# Patient Record
Sex: FEMALE | Race: WHITE | URBAN - METROPOLITAN AREA
[De-identification: names, ages, dates, MRNs, and addresses within clinical notes are randomized per-mention and may not be internally consistent; named-entity substitution may affect disease eponyms.]

---

## 2021-07-21 ENCOUNTER — OFFICE VISIT (OUTPATIENT)
Dept: INTERNAL MEDICINE CLINIC | Facility: CLINIC | Age: 36
End: 2021-07-21
Payer: COMMERCIAL

## 2021-07-21 VITALS
TEMPERATURE: 99 F | HEART RATE: 93 BPM | HEIGHT: 62 IN | DIASTOLIC BLOOD PRESSURE: 84 MMHG | RESPIRATION RATE: 16 BRPM | BODY MASS INDEX: 38.46 KG/M2 | WEIGHT: 209 LBS | SYSTOLIC BLOOD PRESSURE: 134 MMHG | OXYGEN SATURATION: 98 %

## 2021-07-21 DIAGNOSIS — R05.3 CHRONIC COUGH: ICD-10-CM

## 2021-07-21 DIAGNOSIS — E66.9 OBESITY (BMI 30-39.9): ICD-10-CM

## 2021-07-21 DIAGNOSIS — Z00.00 PHYSICAL EXAM: ICD-10-CM

## 2021-07-21 DIAGNOSIS — R03.0 ELEVATED BLOOD PRESSURE READING: ICD-10-CM

## 2021-07-21 DIAGNOSIS — Z13.220 SCREENING FOR CHOLESTEROL LEVEL: ICD-10-CM

## 2021-07-21 DIAGNOSIS — R63.5 WEIGHT GAIN: Primary | ICD-10-CM

## 2021-07-21 DIAGNOSIS — Z13.29 SCREENING FOR THYROID DISORDER: ICD-10-CM

## 2021-07-21 DIAGNOSIS — Z86.16 HISTORY OF COVID-19: ICD-10-CM

## 2021-07-21 DIAGNOSIS — E55.9 VITAMIN D DEFICIENCY: ICD-10-CM

## 2021-07-21 DIAGNOSIS — Z13.1 SCREENING FOR DIABETES MELLITUS: ICD-10-CM

## 2021-07-21 PROCEDURE — 3075F SYST BP GE 130 - 139MM HG: CPT | Performed by: NURSE PRACTITIONER

## 2021-07-21 PROCEDURE — 99204 OFFICE O/P NEW MOD 45 MIN: CPT | Performed by: NURSE PRACTITIONER

## 2021-07-21 PROCEDURE — 3079F DIAST BP 80-89 MM HG: CPT | Performed by: NURSE PRACTITIONER

## 2021-07-21 PROCEDURE — 3008F BODY MASS INDEX DOCD: CPT | Performed by: NURSE PRACTITIONER

## 2021-07-21 NOTE — PROGRESS NOTES
[facility]    CHIEF COMPLAINT:  Patient presents with:  Weight Problem: Rapid weight gain. 148 5 yrs ago.         HISTORY OF PRESENT ILLNESS:  The patient is a 39year old year old female new to this office who [facility]      Smoking status: Never Smoker      Smokeless tobacco: Never Used    Substance and Sexual Activity      Alcohol use: Not Currently      Drug use: Never      Sexual activity: Not on file    Other Topics      Concerns:        Caffeine Concern:  Yes (sepsis) Sister    • No Known Problems Daughter        REVIEW OF SYSTEMS:  Review of Systems   Constitutional: Positive for unexpected weight change. HENT: Negative. Eyes: Negative. Respiratory: Positive for cough. Cardiovascular: Negative.     G day.  She is eating a healthy diet with lots of protein and vegetables.   She does have a significant family history of diabetes and thyroid disease.  -Continue healthy diet and exercise.  -Labs ordered to evaluate further.  -If no explanation for weight ga

## 2021-07-21 NOTE — PATIENT INSTRUCTIONS
Get your labs done. You should be fasting for at least 10 hours. If you take a multivitamin with Biotin or any biotin product it should be held for 3 days prior to getting your labs done.     Follow up in month for your annual physical   High Blood Pressure end your need for medicines in the future. · Begin a weight-loss program if you are overweight. · Cut back on how much salt you get in your diet. Here’s how to do this:  ? Don’t eat foods that have a lot of salt.  These include olives, pickles, smoked srinivasan an automatic blood pressure machine. Your provider can make a recommendation. You can get one of these at most pharmacies.   The American Heart Association recommends the following guidelines for home blood pressure monitoring:  · Don't smoke or drink coffe about your new medicine or your blood pressure, call your provider. Unless told otherwise, follow up with your healthcare provider or this facility within the next 3 days.   When to seek medical advice  Call your healthcare provider right away if any of the

## 2021-07-24 ENCOUNTER — LAB ENCOUNTER (OUTPATIENT)
Dept: LAB | Age: 36
End: 2021-07-24
Attending: NURSE PRACTITIONER
Payer: COMMERCIAL

## 2021-07-24 DIAGNOSIS — Z13.220 SCREENING FOR CHOLESTEROL LEVEL: ICD-10-CM

## 2021-07-24 DIAGNOSIS — Z13.29 SCREENING FOR THYROID DISORDER: ICD-10-CM

## 2021-07-24 DIAGNOSIS — E55.9 VITAMIN D DEFICIENCY: ICD-10-CM

## 2021-07-24 DIAGNOSIS — R63.5 WEIGHT GAIN: ICD-10-CM

## 2021-07-24 DIAGNOSIS — Z13.1 SCREENING FOR DIABETES MELLITUS: ICD-10-CM

## 2021-07-24 DIAGNOSIS — Z00.00 PHYSICAL EXAM: ICD-10-CM

## 2021-07-24 LAB
ALBUMIN SERPL-MCNC: 3.6 G/DL (ref 3.4–5)
ALBUMIN/GLOB SERPL: 1 {RATIO} (ref 1–2)
ALP LIVER SERPL-CCNC: 98 U/L
ALT SERPL-CCNC: 43 U/L
ANION GAP SERPL CALC-SCNC: 5 MMOL/L (ref 0–18)
AST SERPL-CCNC: 27 U/L (ref 15–37)
BASOPHILS # BLD AUTO: 0.05 X10(3) UL (ref 0–0.2)
BASOPHILS NFR BLD AUTO: 0.5 %
BILIRUB SERPL-MCNC: 0.3 MG/DL (ref 0.1–2)
BUN BLD-MCNC: 15 MG/DL (ref 7–18)
BUN/CREAT SERPL: 14.7 (ref 10–20)
CALCIUM BLD-MCNC: 8.8 MG/DL (ref 8.5–10.1)
CHLORIDE SERPL-SCNC: 108 MMOL/L (ref 98–112)
CHOLEST SMN-MCNC: 230 MG/DL (ref ?–200)
CO2 SERPL-SCNC: 23 MMOL/L (ref 21–32)
CREAT BLD-MCNC: 1.02 MG/DL
DEPRECATED RDW RBC AUTO: 47.3 FL (ref 35.1–46.3)
EOSINOPHIL # BLD AUTO: 0.18 X10(3) UL (ref 0–0.7)
EOSINOPHIL NFR BLD AUTO: 1.8 %
ERYTHROCYTE [DISTWIDTH] IN BLOOD BY AUTOMATED COUNT: 15.2 % (ref 11–15)
EST. AVERAGE GLUCOSE BLD GHB EST-MCNC: 117 MG/DL (ref 68–126)
GLOBULIN PLAS-MCNC: 3.6 G/DL (ref 2.8–4.4)
GLUCOSE BLD-MCNC: 88 MG/DL (ref 70–99)
HBA1C MFR BLD HPLC: 5.7 % (ref ?–5.7)
HCT VFR BLD AUTO: 39.5 %
HDLC SERPL-MCNC: 50 MG/DL (ref 40–59)
HGB BLD-MCNC: 12.4 G/DL
IMM GRANULOCYTES # BLD AUTO: 0.03 X10(3) UL (ref 0–1)
IMM GRANULOCYTES NFR BLD: 0.3 %
LDLC SERPL CALC-MCNC: 167 MG/DL (ref ?–100)
LYMPHOCYTES # BLD AUTO: 2.06 X10(3) UL (ref 1–4)
LYMPHOCYTES NFR BLD AUTO: 20.2 %
M PROTEIN MFR SERPL ELPH: 7.2 G/DL (ref 6.4–8.2)
MCH RBC QN AUTO: 27 PG (ref 26–34)
MCHC RBC AUTO-ENTMCNC: 31.4 G/DL (ref 31–37)
MCV RBC AUTO: 86.1 FL
MONOCYTES # BLD AUTO: 0.48 X10(3) UL (ref 0.1–1)
MONOCYTES NFR BLD AUTO: 4.7 %
NEUTROPHILS # BLD AUTO: 7.38 X10 (3) UL (ref 1.5–7.7)
NEUTROPHILS # BLD AUTO: 7.38 X10(3) UL (ref 1.5–7.7)
NEUTROPHILS NFR BLD AUTO: 72.5 %
NONHDLC SERPL-MCNC: 180 MG/DL (ref ?–130)
OSMOLALITY SERPL CALC.SUM OF ELEC: 282 MOSM/KG (ref 275–295)
PATIENT FASTING Y/N/NP: YES
PATIENT FASTING Y/N/NP: YES
PLATELET # BLD AUTO: 356 10(3)UL (ref 150–450)
POTASSIUM SERPL-SCNC: 4.4 MMOL/L (ref 3.5–5.1)
RBC # BLD AUTO: 4.59 X10(6)UL
SODIUM SERPL-SCNC: 136 MMOL/L (ref 136–145)
TRIGL SERPL-MCNC: 74 MG/DL (ref 30–149)
TSI SER-ACNC: 1.61 MIU/ML (ref 0.36–3.74)
VIT B12 SERPL-MCNC: 451 PG/ML (ref 193–986)
VIT D+METAB SERPL-MCNC: 27 NG/ML (ref 30–100)
VLDLC SERPL CALC-MCNC: 15 MG/DL (ref 0–30)
WBC # BLD AUTO: 10.2 X10(3) UL (ref 4–11)

## 2021-07-24 PROCEDURE — 82306 VITAMIN D 25 HYDROXY: CPT | Performed by: NURSE PRACTITIONER

## 2021-07-24 PROCEDURE — 82607 VITAMIN B-12: CPT | Performed by: NURSE PRACTITIONER

## 2021-07-24 PROCEDURE — 80050 GENERAL HEALTH PANEL: CPT | Performed by: NURSE PRACTITIONER

## 2021-07-24 PROCEDURE — 80061 LIPID PANEL: CPT | Performed by: NURSE PRACTITIONER

## 2021-07-24 PROCEDURE — 83036 HEMOGLOBIN GLYCOSYLATED A1C: CPT | Performed by: NURSE PRACTITIONER

## 2021-08-13 ENCOUNTER — OFFICE VISIT (OUTPATIENT)
Dept: INTERNAL MEDICINE CLINIC | Facility: CLINIC | Age: 36
End: 2021-08-13
Payer: COMMERCIAL

## 2021-08-13 VITALS
WEIGHT: 206.81 LBS | OXYGEN SATURATION: 98 % | HEART RATE: 86 BPM | RESPIRATION RATE: 18 BRPM | TEMPERATURE: 98 F | BODY MASS INDEX: 38.06 KG/M2 | SYSTOLIC BLOOD PRESSURE: 136 MMHG | DIASTOLIC BLOOD PRESSURE: 84 MMHG | HEIGHT: 62 IN

## 2021-08-13 DIAGNOSIS — R03.0 ELEVATED BLOOD PRESSURE READING: ICD-10-CM

## 2021-08-13 DIAGNOSIS — E78.5 HYPERLIPIDEMIA, UNSPECIFIED HYPERLIPIDEMIA TYPE: ICD-10-CM

## 2021-08-13 DIAGNOSIS — E66.09 CLASS 2 OBESITY DUE TO EXCESS CALORIES WITHOUT SERIOUS COMORBIDITY WITH BODY MASS INDEX (BMI) OF 37.0 TO 37.9 IN ADULT: ICD-10-CM

## 2021-08-13 DIAGNOSIS — R73.03 PREDIABETES: ICD-10-CM

## 2021-08-13 DIAGNOSIS — Z00.00 ENCOUNTER FOR ANNUAL PHYSICAL EXAM: Primary | ICD-10-CM

## 2021-08-13 PROCEDURE — 99395 PREV VISIT EST AGE 18-39: CPT | Performed by: NURSE PRACTITIONER

## 2021-08-13 PROCEDURE — 3075F SYST BP GE 130 - 139MM HG: CPT | Performed by: NURSE PRACTITIONER

## 2021-08-13 PROCEDURE — 3008F BODY MASS INDEX DOCD: CPT | Performed by: NURSE PRACTITIONER

## 2021-08-13 PROCEDURE — 99214 OFFICE O/P EST MOD 30 MIN: CPT | Performed by: NURSE PRACTITIONER

## 2021-08-13 PROCEDURE — 3079F DIAST BP 80-89 MM HG: CPT | Performed by: NURSE PRACTITIONER

## 2021-08-13 RX ORDER — METFORMIN HYDROCHLORIDE 750 MG/1
750 TABLET, EXTENDED RELEASE ORAL DAILY
Qty: 90 TABLET | Refills: 0 | Status: SHIPPED | OUTPATIENT
Start: 2021-08-13 | End: 2021-11-12 | Stop reason: ALTCHOICE

## 2021-08-13 NOTE — PATIENT INSTRUCTIONS
Start metformin. Monitor for side effects and effectiveness. Continue exercising    Improve your diet    Follow up in 3 months for a med check.

## 2021-08-13 NOTE — PROGRESS NOTES
CHIEF COMPLAINT   Complete physical, weight loss    HPI:   Zhen Bryant is a 39year old female who presents for a complete physical exam.     Wt Readings from Last 6 Encounters:  08/13/21 : 206 lb 12.8 oz (93.8 kg)  07/21/21 : 209 lb (94.8 kg)    Body Other (COPD) Paternal Grandmother    • Other (Alzheimer's Disease) Paternal Grandfather    • Other (sepsis) Sister    • No Known Problems Daughter       Social History:   Social History    Tobacco Use      Smoking status: Never Smoker      Smokeless tobacc Date    WBC 10.2 07/24/2021    RBC 4.59 07/24/2021    HGB 12.4 07/24/2021    HCT 39.5 07/24/2021    MCV 86.1 07/24/2021    MCH 27.0 07/24/2021    MCHC 31.4 07/24/2021    RDW 15.2 (H) 07/24/2021    .0 07/24/2021      Lab Results   Component Value Ranulfo effects and benefits discussed and patient is agreeable to start that at this time  -Follow-up in 3 months to monitor weight and med check  - metFORMIN HCl  MG Oral Tablet 24 Hr; Take 1 tablet (750 mg total) by mouth daily. Dispense: 90 tablet;  Refi

## 2021-11-12 ENCOUNTER — OFFICE VISIT (OUTPATIENT)
Dept: INTERNAL MEDICINE CLINIC | Facility: CLINIC | Age: 36
End: 2021-11-12
Payer: COMMERCIAL

## 2021-11-12 VITALS
SYSTOLIC BLOOD PRESSURE: 118 MMHG | WEIGHT: 209.38 LBS | HEIGHT: 62 IN | RESPIRATION RATE: 14 BRPM | DIASTOLIC BLOOD PRESSURE: 76 MMHG | HEART RATE: 104 BPM | OXYGEN SATURATION: 97 % | TEMPERATURE: 98 F | BODY MASS INDEX: 38.53 KG/M2

## 2021-11-12 DIAGNOSIS — E66.9 OBESITY (BMI 30-39.9): Primary | ICD-10-CM

## 2021-11-12 PROCEDURE — 99213 OFFICE O/P EST LOW 20 MIN: CPT | Performed by: NURSE PRACTITIONER

## 2021-11-12 PROCEDURE — 3074F SYST BP LT 130 MM HG: CPT | Performed by: NURSE PRACTITIONER

## 2021-11-12 PROCEDURE — 3008F BODY MASS INDEX DOCD: CPT | Performed by: NURSE PRACTITIONER

## 2021-11-12 PROCEDURE — 3078F DIAST BP <80 MM HG: CPT | Performed by: NURSE PRACTITIONER

## 2021-11-12 NOTE — PROGRESS NOTES
Cole Schaffer is a 39year old female. CHIEF COMPLAINT   Obesity fu    HPI:   Metformin was not helpful. Took it for 6 weeks or so. Ran out. No SE though    Not tracking diet. Is exercising great.  Has not gained weight but has not lost.     No current 07/24/2021     07/24/2021    CO2 23.0 07/24/2021      Lab Results   Component Value Date    CHOLEST 230 (H) 07/24/2021    TRIG 74 07/24/2021    HDL 50 07/24/2021     (H) 07/24/2021    VLDL 15 07/24/2021    NONHDLC 180 (H) 07/24/2021      Lab R

## 2021-11-12 NOTE — PATIENT INSTRUCTIONS
Use my fitness pal karla daily. Lower carbs and increase protein.     Continue exercising    Follow up in 3 months or sooner as needed

## 2022-01-21 ENCOUNTER — IMMUNIZATION (OUTPATIENT)
Dept: LAB | Facility: HOSPITAL | Age: 37
End: 2022-01-21
Attending: EMERGENCY MEDICINE
Payer: COMMERCIAL

## 2022-01-21 DIAGNOSIS — Z23 NEED FOR VACCINATION: Primary | ICD-10-CM

## 2022-01-21 PROCEDURE — 0064A SARSCOV2 VAC 50MCG/0.25ML IM: CPT

## 2022-02-11 ENCOUNTER — OFFICE VISIT (OUTPATIENT)
Dept: INTERNAL MEDICINE CLINIC | Facility: CLINIC | Age: 37
End: 2022-02-11
Payer: COMMERCIAL

## 2022-02-11 VITALS
HEIGHT: 62 IN | TEMPERATURE: 98 F | WEIGHT: 215.38 LBS | DIASTOLIC BLOOD PRESSURE: 76 MMHG | OXYGEN SATURATION: 98 % | BODY MASS INDEX: 39.63 KG/M2 | HEART RATE: 115 BPM | RESPIRATION RATE: 16 BRPM | SYSTOLIC BLOOD PRESSURE: 122 MMHG

## 2022-02-11 DIAGNOSIS — R63.5 WEIGHT GAIN: ICD-10-CM

## 2022-02-11 DIAGNOSIS — E66.9 OBESITY (BMI 30-39.9): Primary | ICD-10-CM

## 2022-02-11 DIAGNOSIS — Z23 NEED FOR VACCINATION: ICD-10-CM

## 2022-02-11 DIAGNOSIS — L98.9 SKIN LESION: ICD-10-CM

## 2022-02-11 DIAGNOSIS — R53.83 FATIGUE, UNSPECIFIED TYPE: ICD-10-CM

## 2022-02-11 PROCEDURE — 99214 OFFICE O/P EST MOD 30 MIN: CPT | Performed by: NURSE PRACTITIONER

## 2022-02-11 PROCEDURE — 3008F BODY MASS INDEX DOCD: CPT | Performed by: NURSE PRACTITIONER

## 2022-02-11 PROCEDURE — 3074F SYST BP LT 130 MM HG: CPT | Performed by: NURSE PRACTITIONER

## 2022-02-11 PROCEDURE — 3078F DIAST BP <80 MM HG: CPT | Performed by: NURSE PRACTITIONER

## 2022-02-11 NOTE — PATIENT INSTRUCTIONS
See endocrinology for further evaluation    Go to weight loss clinic if needed     Get your labs done once your see endo    Follow up as needed or when your annual visit is due

## 2022-03-10 ENCOUNTER — OFFICE VISIT (OUTPATIENT)
Dept: ENDOCRINOLOGY CLINIC | Facility: CLINIC | Age: 37
End: 2022-03-10
Payer: COMMERCIAL

## 2022-03-10 VITALS
RESPIRATION RATE: 18 BRPM | WEIGHT: 211 LBS | HEART RATE: 99 BPM | BODY MASS INDEX: 38.83 KG/M2 | HEIGHT: 62 IN | SYSTOLIC BLOOD PRESSURE: 138 MMHG | DIASTOLIC BLOOD PRESSURE: 96 MMHG

## 2022-03-10 DIAGNOSIS — E66.01 CLASS 2 SEVERE OBESITY DUE TO EXCESS CALORIES WITH SERIOUS COMORBIDITY AND BODY MASS INDEX (BMI) OF 38.0 TO 38.9 IN ADULT (HCC): ICD-10-CM

## 2022-03-10 DIAGNOSIS — E04.9 GOITER: Primary | ICD-10-CM

## 2022-03-10 PROBLEM — E66.812 CLASS 2 SEVERE OBESITY DUE TO EXCESS CALORIES WITH SERIOUS COMORBIDITY AND BODY MASS INDEX (BMI) OF 38.0 TO 38.9 IN ADULT (HCC): Status: ACTIVE | Noted: 2022-03-10

## 2022-03-10 PROCEDURE — 3008F BODY MASS INDEX DOCD: CPT | Performed by: INTERNAL MEDICINE

## 2022-03-10 PROCEDURE — 3075F SYST BP GE 130 - 139MM HG: CPT | Performed by: INTERNAL MEDICINE

## 2022-03-10 PROCEDURE — 99244 OFF/OP CNSLTJ NEW/EST MOD 40: CPT | Performed by: INTERNAL MEDICINE

## 2022-03-10 PROCEDURE — 3080F DIAST BP >= 90 MM HG: CPT | Performed by: INTERNAL MEDICINE

## 2022-03-15 ENCOUNTER — PATIENT MESSAGE (OUTPATIENT)
Dept: INTERNAL MEDICINE CLINIC | Facility: CLINIC | Age: 37
End: 2022-03-15

## 2022-03-15 ENCOUNTER — LAB ENCOUNTER (OUTPATIENT)
Dept: LAB | Age: 37
End: 2022-03-15
Attending: INTERNAL MEDICINE
Payer: COMMERCIAL

## 2022-03-15 DIAGNOSIS — L98.9 SKIN LESION: ICD-10-CM

## 2022-03-15 DIAGNOSIS — E66.01 CLASS 2 SEVERE OBESITY DUE TO EXCESS CALORIES WITH SERIOUS COMORBIDITY AND BODY MASS INDEX (BMI) OF 38.0 TO 38.9 IN ADULT (HCC): ICD-10-CM

## 2022-03-15 DIAGNOSIS — R53.83 FATIGUE, UNSPECIFIED TYPE: ICD-10-CM

## 2022-03-15 LAB
ALBUMIN SERPL-MCNC: 3.5 G/DL (ref 3.4–5)
ALBUMIN/GLOB SERPL: 1.2 {RATIO} (ref 1–2)
ALP LIVER SERPL-CCNC: 90 U/L
ALT SERPL-CCNC: 35 U/L
ANION GAP SERPL CALC-SCNC: 6 MMOL/L (ref 0–18)
AST SERPL-CCNC: 27 U/L (ref 15–37)
BILIRUB SERPL-MCNC: 0.3 MG/DL (ref 0.1–2)
BUN BLD-MCNC: 12 MG/DL (ref 7–18)
CALCIUM BLD-MCNC: 9.1 MG/DL (ref 8.5–10.1)
CHLORIDE SERPL-SCNC: 108 MMOL/L (ref 98–112)
CHOLEST SERPL-MCNC: 200 MG/DL (ref ?–200)
CO2 SERPL-SCNC: 24 MMOL/L (ref 21–32)
CORTIS SERPL-MCNC: 20.2 UG/DL
CREAT BLD-MCNC: 0.95 MG/DL
CRP SERPL-MCNC: 1.59 MG/DL (ref ?–0.3)
ERYTHROCYTE [SEDIMENTATION RATE] IN BLOOD: 24 MM/HR
EST. AVERAGE GLUCOSE BLD GHB EST-MCNC: 114 MG/DL (ref 68–126)
FASTING PATIENT LIPID ANSWER: YES
FASTING STATUS PATIENT QL REPORTED: YES
GLOBULIN PLAS-MCNC: 3 G/DL (ref 2.8–4.4)
GLUCOSE BLD-MCNC: 86 MG/DL (ref 70–99)
HBA1C MFR BLD: 5.6 % (ref ?–5.7)
HDLC SERPL-MCNC: 53 MG/DL (ref 40–59)
LDLC SERPL CALC-MCNC: 121 MG/DL (ref ?–100)
NONHDLC SERPL-MCNC: 147 MG/DL (ref ?–130)
OSMOLALITY SERPL CALC.SUM OF ELEC: 285 MOSM/KG (ref 275–295)
POTASSIUM SERPL-SCNC: 4.3 MMOL/L (ref 3.5–5.1)
PROT SERPL-MCNC: 6.5 G/DL (ref 6.4–8.2)
SODIUM SERPL-SCNC: 138 MMOL/L (ref 136–145)
T4 FREE SERPL-MCNC: 1 NG/DL (ref 0.8–1.7)
TRIGL SERPL-MCNC: 145 MG/DL (ref 30–149)
TSI SER-ACNC: 3.09 MIU/ML (ref 0.36–3.74)
VLDLC SERPL CALC-MCNC: 26 MG/DL (ref 0–30)

## 2022-03-15 PROCEDURE — 85652 RBC SED RATE AUTOMATED: CPT | Performed by: NURSE PRACTITIONER

## 2022-03-15 PROCEDURE — 86140 C-REACTIVE PROTEIN: CPT | Performed by: NURSE PRACTITIONER

## 2022-03-15 PROCEDURE — 86038 ANTINUCLEAR ANTIBODIES: CPT | Performed by: NURSE PRACTITIONER

## 2022-03-15 PROCEDURE — 86200 CCP ANTIBODY: CPT | Performed by: NURSE PRACTITIONER

## 2022-03-15 NOTE — TELEPHONE ENCOUNTER
The point of going to them was to make sure the was no hormonal cause to the weight gain before going to weight loss clinic. CRP is a non specific inflammatory marker and can be elevated for acute thyroiditis since it is inflammation but not for other benign thyroid enlargement. Lets wait for the US of the thyroid and the other lab results to come back before we know more. Thanks.

## 2022-03-16 LAB
ADRENOCORTICOTROPIC HORMONE: 15.7 PG/ML
ANA SER QL: NEGATIVE

## 2022-03-18 LAB — CCP IGG SERPL-ACNC: 2 U/ML (ref 0–6.9)

## 2022-03-21 ENCOUNTER — TELEPHONE (OUTPATIENT)
Dept: ENDOCRINOLOGY CLINIC | Facility: CLINIC | Age: 37
End: 2022-03-21

## 2022-03-21 ENCOUNTER — HOSPITAL ENCOUNTER (OUTPATIENT)
Dept: ULTRASOUND IMAGING | Age: 37
Discharge: HOME | End: 2022-03-21
Attending: INTERNAL MEDICINE
Payer: COMMERCIAL

## 2022-03-21 DIAGNOSIS — E04.9 GOITER: ICD-10-CM

## 2022-03-21 PROCEDURE — 76536 US EXAM OF HEAD AND NECK: CPT | Performed by: INTERNAL MEDICINE

## 2022-03-22 ENCOUNTER — OFFICE VISIT (OUTPATIENT)
Dept: INTERNAL MEDICINE CLINIC | Facility: CLINIC | Age: 37
End: 2022-03-22
Payer: COMMERCIAL

## 2022-03-22 VITALS
BODY MASS INDEX: 38.57 KG/M2 | HEART RATE: 91 BPM | SYSTOLIC BLOOD PRESSURE: 130 MMHG | OXYGEN SATURATION: 96 % | HEIGHT: 62.5 IN | DIASTOLIC BLOOD PRESSURE: 70 MMHG | WEIGHT: 215 LBS | RESPIRATION RATE: 17 BRPM

## 2022-03-22 DIAGNOSIS — R73.03 PREDIABETES: ICD-10-CM

## 2022-03-22 DIAGNOSIS — E55.9 VITAMIN D DEFICIENCY: ICD-10-CM

## 2022-03-22 DIAGNOSIS — E78.5 HYPERLIPIDEMIA, UNSPECIFIED HYPERLIPIDEMIA TYPE: ICD-10-CM

## 2022-03-22 DIAGNOSIS — R63.2 BINGE EATING: ICD-10-CM

## 2022-03-22 DIAGNOSIS — E66.9 OBESITY (BMI 30-39.9): ICD-10-CM

## 2022-03-22 DIAGNOSIS — Z51.81 THERAPEUTIC DRUG MONITORING: Primary | ICD-10-CM

## 2022-03-22 PROCEDURE — 3078F DIAST BP <80 MM HG: CPT | Performed by: NURSE PRACTITIONER

## 2022-03-22 PROCEDURE — 93000 ELECTROCARDIOGRAM COMPLETE: CPT | Performed by: NURSE PRACTITIONER

## 2022-03-22 PROCEDURE — 99214 OFFICE O/P EST MOD 30 MIN: CPT | Performed by: NURSE PRACTITIONER

## 2022-03-22 PROCEDURE — 3008F BODY MASS INDEX DOCD: CPT | Performed by: NURSE PRACTITIONER

## 2022-03-22 PROCEDURE — 3075F SYST BP GE 130 - 139MM HG: CPT | Performed by: NURSE PRACTITIONER

## 2022-03-22 NOTE — PATIENT INSTRUCTIONS
We are here to support you with weight loss, but please remember that you still need your primary care provider for your routine health maintenance. PLAN:  Will start vyvanse 20mg daily  Will check with insurance on coverage for GLP-1 medication (ie. saxenda or wegovy)   Follow up with me in 4-5 weeks  Schedule follow up appointments: Yuni Shipman (dietitian) or Wen Fairchild (presurgery dietitian)   Check for insurance coverage for dietitian and labwork prior to scheduling appointment. Please try to work on the following dietary changes:  1. Goals: Aim for 20-30 grams of protein/ meal  i. Aim for 110 grams of carbohydrates/day  ii. Eat 4-6 vegetables/day  iii. Avoid skipping meals- eat every 4-5 hours  iv. Aim for 3 meals/day  2. Drink lots of water and cut down on soda/juice consumption if soda/juice drinker  3. Focus on protein: (15-30 grams with each meal) ie. greek yogurt, cottage cheese, string cheese, hard boiled eggs  4. Healthy snacks: peanut butter and apples, hummus and carrots, berries, nuts (1/4 cup), tuna and crackers                 Protein Shakes: Premier protein or Core Power                Protein Bars: Rx Bars, Oatmega, Power Crunch                 Sargento balanced breaks (cheese and nuts)- without chocolate  5. Reduce carbohydrates which includes sweets as well as rice, pasta, potatoes, bread, corn and instead choose whole grain options or more protein or vegetables (4-6 servings of vegetables per day)  6. Get a good night of sleep  7. Try to decrease stress in life     Please download apps:  1. \"My Fitness Pal\" (other option is Lose it)) to help you to monitor daily dietary intake and you will be able to see if you are eating the right amount of calories, protein, carbs                With My Fitness Pal-->When you set-up the karla or need to adjust settings:                Goals should include:                  Lose 1.5-2 lbs per week                Activity level: not very active (can't count exercise towards calorie number per day)                   ** Daily INPUT> Look at nutrition section-- \"nutrients\" and it will break down your macros for the day (ie. Protein, carbs, fibers, sugars and fats). Try to stay within these numbers daily     2. \"7 minute workout\" to help with exercise/activity which takes 7 minutes of your day and that you can do at home! 3. \"Calm\" or \"Headspace\" which helps with mindfulness, meditation, clarity, sleep, and laura to your daily life. 4. [employer] blog for healthy recipe ideas  5. [employer] for low carb resources    HIGH PROTEIN SNACK IDEAS  -cottage cheese  -plain yogurt  -kefir  -hard-boiled eggs  -natural cheeses  -nuts (measure portion size)   -unsweetened nut butters  -dried edamame   -linda seeds soaked in water or almond milk  -soy nuts  -cured meats (monitor for sodium issues)   -hummus with vegetables  -bean dip with vegetables     FRUIT  Low carb fruit options   Raspberries: Half a cup (60 grams) contains 3 grams of carbs. Blackberries: Half a cup (70 grams) contains 4 grams of carbs. Strawberries: Half a cup (100 grams) contains 6 grams of carbs. Blueberries: Half a cup (50 grams) contains 6 grams of carbs. Plum: One medium-sized (80 grams) contains 6 grams of carbs.      VEGETABLES  Low carb vegetables

## 2022-03-24 PROBLEM — R63.2 BINGE EATING: Status: ACTIVE | Noted: 2022-03-24

## 2022-03-24 PROBLEM — R73.03 PREDIABETES: Status: ACTIVE | Noted: 2022-03-24

## 2022-03-24 PROBLEM — Z51.81 THERAPEUTIC DRUG MONITORING: Status: ACTIVE | Noted: 2022-03-24

## 2022-03-24 PROBLEM — E55.9 VITAMIN D DEFICIENCY: Status: ACTIVE | Noted: 2022-03-24

## 2022-03-24 PROBLEM — R03.0 ELEVATED BLOOD PRESSURE READING: Status: ACTIVE | Noted: 2022-03-24

## 2022-03-24 PROBLEM — E78.5 HYPERLIPIDEMIA: Status: ACTIVE | Noted: 2022-03-24

## 2022-03-25 ENCOUNTER — TELEPHONE (OUTPATIENT)
Dept: ENDOCRINOLOGY CLINIC | Facility: CLINIC | Age: 37
End: 2022-03-25

## 2022-03-25 NOTE — TELEPHONE ENCOUNTER
Hi!  Can we please call this patient and let her know that I have reviewed her blood tests and that I do not see any abnormalities in her blood tests or her thyroid US? I do think that it would be very beneficial for her to consult with bariatrics, Dr. Maximo Mary or Dr. Chelsie Sauceda for aid in weight loss. I cannot find any endocrine cause for her weight issues. Thank you.

## 2022-04-25 ENCOUNTER — OFFICE VISIT (OUTPATIENT)
Dept: INTERNAL MEDICINE CLINIC | Facility: CLINIC | Age: 37
End: 2022-04-25
Payer: COMMERCIAL

## 2022-04-25 VITALS
SYSTOLIC BLOOD PRESSURE: 128 MMHG | RESPIRATION RATE: 16 BRPM | DIASTOLIC BLOOD PRESSURE: 76 MMHG | WEIGHT: 210 LBS | BODY MASS INDEX: 37.68 KG/M2 | HEIGHT: 62.5 IN | HEART RATE: 80 BPM

## 2022-04-25 DIAGNOSIS — E78.5 HYPERLIPIDEMIA, UNSPECIFIED HYPERLIPIDEMIA TYPE: ICD-10-CM

## 2022-04-25 DIAGNOSIS — E66.9 OBESITY (BMI 30-39.9): ICD-10-CM

## 2022-04-25 DIAGNOSIS — Z51.81 THERAPEUTIC DRUG MONITORING: Primary | ICD-10-CM

## 2022-04-25 DIAGNOSIS — E55.9 VITAMIN D DEFICIENCY: ICD-10-CM

## 2022-04-25 DIAGNOSIS — R63.2 BINGE EATING: ICD-10-CM

## 2022-04-25 DIAGNOSIS — R73.03 PREDIABETES: ICD-10-CM

## 2022-04-25 PROCEDURE — 3078F DIAST BP <80 MM HG: CPT | Performed by: NURSE PRACTITIONER

## 2022-04-25 PROCEDURE — 3008F BODY MASS INDEX DOCD: CPT | Performed by: NURSE PRACTITIONER

## 2022-04-25 PROCEDURE — 99214 OFFICE O/P EST MOD 30 MIN: CPT | Performed by: NURSE PRACTITIONER

## 2022-04-25 PROCEDURE — 3074F SYST BP LT 130 MM HG: CPT | Performed by: NURSE PRACTITIONER

## 2022-04-25 RX ORDER — PHENTERMINE HYDROCHLORIDE 37.5 MG/1
37.5 TABLET ORAL
Qty: 30 TABLET | Refills: 1 | Status: SHIPPED | OUTPATIENT
Start: 2022-04-25

## 2022-04-25 RX ORDER — METFORMIN HYDROCHLORIDE 500 MG/1
500 TABLET, EXTENDED RELEASE ORAL 2 TIMES DAILY WITH MEALS
Qty: 60 TABLET | Refills: 1 | Status: SHIPPED | OUTPATIENT
Start: 2022-04-25

## 2022-04-25 NOTE — PATIENT INSTRUCTIONS
We are here to support you with weight loss, but please remember that you still need your primary care provider for your routine health maintenance. PLAN:  Will stop vyvanse  Will trial phentermine (cut tablet in 1/2 for 2-4 days and then take the full dose)  Will add back metformin 500mg (1 tablet with breakfast and 1 tablet with dinner)   Follow up with me in 4 weeks  Schedule follow up appointments: Jovita Bush (dietitian) or Hamilton Spangler (presurgery dietitian)   Check for insurance coverage for dietitian and labwork prior to scheduling appointment. Please try to work on the following dietary changes:  1. Goals: Aim for 20-30 grams of protein/ meal  i. Aim for 110 grams of carbohydrates/day  ii. Eat 4-6 vegetables/day  iii. Avoid skipping meals- eat every 4-5 hours  iv. Aim for 3 meals/day  2. Drink lots of water and cut down on soda/juice consumption if soda/juice drinker  3. Focus on protein: (15-30 grams with each meal) ie. greek yogurt, cottage cheese, string cheese, hard boiled eggs  4. Healthy snacks: peanut butter and apples, hummus and carrots, berries, nuts (1/4 cup), tuna and crackers                 Protein Shakes: Premier protein or Core Power                Protein Bars: Rx Bars, Oatmega, Power Crunch                 S[place]ento balanced breaks (cheese and nuts)- without chocolate  5. Reduce carbohydrates which includes sweets as well as rice, pasta, potatoes, bread, corn and instead choose whole grain options or more protein or vegetables (4-6 servings of vegetables per day)  6. Get a good night of sleep  7. Try to decrease stress in life     Please download apps:  1. \"My Fitness Pal\" (other option is Lose it)) to help you to monitor daily dietary intake and you will be able to see if you are eating the right amount of calories, protein, carbs                With My Fitness Pal-->When you set-up the karla or need to adjust settings:                Goals should include:                  Lose 1.5-2 lbs per week                Activity level: not very active (can't count exercise towards calorie number per day)                   ** Daily INPUT> Look at nutrition section-- \"nutrients\" and it will break down your macros for the day (ie. Protein, carbs, fibers, sugars and fats). Try to stay within these numbers daily     2. \"7 minute workout\" to help with exercise/activity which takes 7 minutes of your day and that you can do at home! 3. \"Calm\" or \"Headspace\" which helps with mindfulness, meditation, clarity, sleep, and laura to your daily life. 4. [employer] blog for healthy recipe ideas  5. [employer] for low carb resources    HIGH PROTEIN SNACK IDEAS  -cottage cheese  -plain yogurt  -kefir  -hard-boiled eggs  -natural cheeses  -nuts (measure portion size)   -unsweetened nut butters  -dried edamame   -linda seeds soaked in water or almond milk  -soy nuts  -cured meats (monitor for sodium issues)   -hummus with vegetables  -bean dip with vegetables     FRUIT  Low carb fruit options   Raspberries: Half a cup (60 grams) contains 3 grams of carbs. Blackberries: Half a cup (70 grams) contains 4 grams of carbs. Strawberries: Half a cup (100 grams) contains 6 grams of carbs. Blueberries: Half a cup (50 grams) contains 6 grams of carbs. Plum: One medium-sized (80 grams) contains 6 grams of carbs.      VEGETABLES  Low carb vegetables

## 2022-05-31 ENCOUNTER — OFFICE VISIT (OUTPATIENT)
Dept: INTERNAL MEDICINE CLINIC | Facility: CLINIC | Age: 37
End: 2022-05-31
Payer: COMMERCIAL

## 2022-05-31 VITALS
OXYGEN SATURATION: 99 % | DIASTOLIC BLOOD PRESSURE: 72 MMHG | SYSTOLIC BLOOD PRESSURE: 134 MMHG | RESPIRATION RATE: 16 BRPM | WEIGHT: 203 LBS | BODY MASS INDEX: 36.42 KG/M2 | HEART RATE: 92 BPM | HEIGHT: 62.5 IN

## 2022-05-31 DIAGNOSIS — Z51.81 THERAPEUTIC DRUG MONITORING: Primary | ICD-10-CM

## 2022-05-31 DIAGNOSIS — R73.03 PREDIABETES: ICD-10-CM

## 2022-05-31 DIAGNOSIS — E55.9 VITAMIN D DEFICIENCY: ICD-10-CM

## 2022-05-31 DIAGNOSIS — E66.9 OBESITY (BMI 30-39.9): ICD-10-CM

## 2022-05-31 DIAGNOSIS — E78.5 HYPERLIPIDEMIA, UNSPECIFIED HYPERLIPIDEMIA TYPE: ICD-10-CM

## 2022-05-31 PROCEDURE — 3078F DIAST BP <80 MM HG: CPT | Performed by: NURSE PRACTITIONER

## 2022-05-31 PROCEDURE — 99214 OFFICE O/P EST MOD 30 MIN: CPT | Performed by: NURSE PRACTITIONER

## 2022-05-31 PROCEDURE — 3008F BODY MASS INDEX DOCD: CPT | Performed by: NURSE PRACTITIONER

## 2022-05-31 PROCEDURE — 3075F SYST BP GE 130 - 139MM HG: CPT | Performed by: NURSE PRACTITIONER

## 2022-05-31 RX ORDER — PHENTERMINE HYDROCHLORIDE 37.5 MG/1
37.5 TABLET ORAL
Qty: 30 TABLET | Refills: 1 | Status: CANCELLED | OUTPATIENT
Start: 2022-05-31

## 2022-05-31 NOTE — PATIENT INSTRUCTIONS
We are here to support you with weight loss, but please remember that you still need your primary care provider for your routine health maintenance. PLAN:  Will continue with phentermine and metformin   Follow up with me in 8 weeks  Schedule follow up appointments: Nicola Mitchell (dietitian) or Giovani Ramirez (presurgery dietitian)   Check for insurance coverage for dietitian and labwork prior to scheduling appointment. Please try to work on the following dietary changes:  1. Goals: Aim for 20-30 grams of protein/ meal  i. Aim for 100 grams of carbohydrates/day  ii. Eat 4-6 vegetables/day  iii. Avoid skipping meals- eat every 4-5 hours  iv. Aim for 3 meals/day  2. Drink lots of water and cut down on soda/juice consumption if soda/juice drinker  3. Focus on protein: (15-30 grams with each meal) ie. greek yogurt, cottage cheese, string cheese, hard boiled eggs  4. Healthy snacks: peanut butter and apples, hummus and carrots, berries, nuts (1/4 cup), tuna and crackers                 Protein Shakes: Premier protein or Core Power                Protein Bars: Rx Bars, Oatmega, Power Crunch                 S[place]ento balanced breaks (cheese and nuts)- without chocolate  5. Reduce carbohydrates which includes sweets as well as rice, pasta, potatoes, bread, corn and instead choose whole grain options or more protein or vegetables (4-6 servings of vegetables per day)  6. Get a good night of sleep  7. Try to decrease stress in life     Please download apps:  1. \"My Fitness Pal\" (other option is Lose it)) to help you to monitor daily dietary intake and you will be able to see if you are eating the right amount of calories, protein, carbs                With My Fitness Pal-->When you set-up the karla or need to adjust settings:                Goals should include:                  Lose 1.5-2 lbs per week                Activity level: not very active (can't count exercise towards calorie number per day)                   ** Daily INPUT> Look at nutrition section-- \"nutrients\" and it will break down your macros for the day (ie. Protein, carbs, fibers, sugars and fats). Try to stay within these numbers daily     2. \"7 minute workout\" to help with exercise/activity which takes 7 minutes of your day and that you can do at home! 3. \"Calm\" or \"Headspace\" which helps with mindfulness, meditation, clarity, sleep, and laura to your daily life. 4. [employer] blog for healthy recipe ideas  5. [employer] for low carb resources    HIGH PROTEIN SNACK IDEAS  -cottage cheese  -plain yogurt  -kefir  -hard-boiled eggs  -natural cheeses  -nuts (measure portion size)   -unsweetened nut butters  -dried edamame   -linda seeds soaked in water or almond milk  -soy nuts  -cured meats (monitor for sodium issues)   -hummus with vegetables  -bean dip with vegetables     FRUIT  Low carb fruit options   Raspberries: Half a cup (60 grams) contains 3 grams of carbs. Blackberries: Half a cup (70 grams) contains 4 grams of carbs. Strawberries: Half a cup (100 grams) contains 6 grams of carbs. Blueberries: Half a cup (50 grams) contains 6 grams of carbs. Plum: One medium-sized (80 grams) contains 6 grams of carbs.      VEGETABLES  Low carb vegetables

## 2022-07-11 NOTE — TELEPHONE ENCOUNTER
Requesting Phentermine  LOV: 5/31/22  RTC: 8 weeks  Last Relevant Labs: na  Filled: 4/25/22 #30 with 1 refills last [place] 5[place]31/22 [place]30 [place]    Future Appointments   Date Time Provider Baltazar Hopkins   7/25/2022 11:40 AM Gabby Parker APRN [facility] [address]

## 2022-07-12 RX ORDER — PHENTERMINE HYDROCHLORIDE 37.5 MG/1
37.5 TABLET ORAL
Qty: 30 TABLET | Refills: 1 | Status: SHIPPED | OUTPATIENT
Start: 2022-07-12

## 2022-07-25 ENCOUNTER — OFFICE VISIT (OUTPATIENT)
Dept: INTERNAL MEDICINE CLINIC | Facility: CLINIC | Age: 37
End: 2022-07-25
Payer: COMMERCIAL

## 2022-07-25 VITALS
WEIGHT: 194 LBS | HEIGHT: 62.5 IN | RESPIRATION RATE: 16 BRPM | SYSTOLIC BLOOD PRESSURE: 124 MMHG | BODY MASS INDEX: 34.81 KG/M2 | HEART RATE: 90 BPM | DIASTOLIC BLOOD PRESSURE: 80 MMHG

## 2022-07-25 DIAGNOSIS — R63.2 BINGE EATING: ICD-10-CM

## 2022-07-25 DIAGNOSIS — E66.9 OBESITY (BMI 30-39.9): ICD-10-CM

## 2022-07-25 DIAGNOSIS — Z51.81 THERAPEUTIC DRUG MONITORING: Primary | ICD-10-CM

## 2022-07-25 DIAGNOSIS — E55.9 VITAMIN D DEFICIENCY: ICD-10-CM

## 2022-07-25 DIAGNOSIS — E78.5 HYPERLIPIDEMIA, UNSPECIFIED HYPERLIPIDEMIA TYPE: ICD-10-CM

## 2022-07-25 DIAGNOSIS — R73.03 PREDIABETES: ICD-10-CM

## 2022-07-25 PROCEDURE — 3008F BODY MASS INDEX DOCD: CPT | Performed by: NURSE PRACTITIONER

## 2022-07-25 PROCEDURE — 99214 OFFICE O/P EST MOD 30 MIN: CPT | Performed by: NURSE PRACTITIONER

## 2022-07-25 PROCEDURE — 3074F SYST BP LT 130 MM HG: CPT | Performed by: NURSE PRACTITIONER

## 2022-07-25 PROCEDURE — 3079F DIAST BP 80-89 MM HG: CPT | Performed by: NURSE PRACTITIONER

## 2022-07-25 RX ORDER — METFORMIN HYDROCHLORIDE 500 MG/1
500 TABLET, EXTENDED RELEASE ORAL 2 TIMES DAILY WITH MEALS
Qty: 60 TABLET | Refills: 1 | Status: CANCELLED
Start: 2022-07-25

## 2022-07-25 NOTE — PATIENT INSTRUCTIONS
We are here to support you with weight loss, but please remember that you still need your primary care provider for your routine health maintenance. PLAN:  Will continue with phentermine 37.5mg and metformin   Follow up with me in 12 weeks  Schedule follow up appointments: Torsten Silverio (dietitian) or Cuca Schneider (presurgery dietitian)   Check for insurance coverage for dietitian and labwork prior to scheduling appointment. Please try to work on the following dietary changes:  1. Goals: Aim for 20-30 grams of protein/ meal  i. Aim for 100 grams of carbohydrates/day  ii. Eat 4-6 vegetables/day  iii. Avoid skipping meals- eat every 4-5 hours  iv. Aim for 3 meals/day  2. Drink lots of water and cut down on soda/juice consumption if soda/juice drinker  3. Focus on protein: (15-30 grams with each meal) ie. greek yogurt, cottage cheese, string cheese, hard boiled eggs  4. Healthy snacks: peanut butter and apples, hummus and carrots, berries, nuts (1/4 cup), tuna and crackers                 Protein Shakes: Premier protein or Core Power                Protein Bars: Rx Bars, Oatmega, Power Crunch                 Sargento balanced breaks (cheese and nuts)- without chocolate  5. Reduce carbohydrates which includes sweets as well as rice, pasta, potatoes, bread, corn and instead choose whole grain options or more protein or vegetables (4-6 servings of vegetables per day)  6. Get a good night of sleep  7. Try to decrease stress in life     Please download apps:  1. \"My Fitness Pal\" (other option is Lose it)) to help you to monitor daily dietary intake and you will be able to see if you are eating the right amount of calories, protein, carbs                With My Fitness Pal-->When you set-up the karla or need to adjust settings:                Goals should include:                  Lose 1.5-2 lbs per week                Activity level: not very active (can't count exercise towards calorie number per day) ** Daily INPUT> Look at nutrition section-- \"nutrients\" and it will break down your macros for the day (ie. Protein, carbs, fibers, sugars and fats). Try to stay within these numbers daily     2. \"7 minute workout\" to help with exercise/activity which takes 7 minutes of your day and that you can do at home! 3. \"Calm\" or \"Headspace\" which helps with mindfulness, meditation, clarity, sleep, and laura to your daily life. 4. [employer] blog for healthy recipe ideas  5. [employer] for low carb resources    HIGH PROTEIN SNACK IDEAS  -cottage cheese  -plain yogurt  -kefir  -hard-boiled eggs  -natural cheeses  -nuts (measure portion size)   -unsweetened nut butters  -dried edamame   -linda seeds soaked in water or almond milk  -soy nuts  -cured meats (monitor for sodium issues)   -hummus with vegetables  -bean dip with vegetables     FRUIT  Low carb fruit options   Raspberries: Half a cup (60 grams) contains 3 grams of carbs. Blackberries: Half a cup (70 grams) contains 4 grams of carbs. Strawberries: Half a cup (100 grams) contains 6 grams of carbs. Blueberries: Half a cup (50 grams) contains 6 grams of carbs. Plum: One medium-sized (80 grams) contains 6 grams of carbs.      VEGETABLES  Low carb vegetables

## 2022-08-22 RX ORDER — METFORMIN HYDROCHLORIDE 500 MG/1
500 TABLET, EXTENDED RELEASE ORAL 2 TIMES DAILY WITH MEALS
Qty: 60 TABLET | Refills: 1 | Status: SHIPPED | OUTPATIENT
Start: 2022-08-22

## 2022-10-10 ENCOUNTER — OFFICE VISIT (OUTPATIENT)
Dept: INTERNAL MEDICINE CLINIC | Facility: CLINIC | Age: 37
End: 2022-10-10
Payer: COMMERCIAL

## 2022-10-10 VITALS
HEART RATE: 94 BPM | SYSTOLIC BLOOD PRESSURE: 138 MMHG | HEIGHT: 62.5 IN | WEIGHT: 190 LBS | DIASTOLIC BLOOD PRESSURE: 86 MMHG | BODY MASS INDEX: 34.09 KG/M2 | RESPIRATION RATE: 16 BRPM

## 2022-10-10 DIAGNOSIS — Z51.81 THERAPEUTIC DRUG MONITORING: Primary | ICD-10-CM

## 2022-10-10 DIAGNOSIS — E55.9 VITAMIN D DEFICIENCY: ICD-10-CM

## 2022-10-10 DIAGNOSIS — R63.2 BINGE EATING: ICD-10-CM

## 2022-10-10 DIAGNOSIS — R73.03 PREDIABETES: ICD-10-CM

## 2022-10-10 DIAGNOSIS — E78.5 HYPERLIPIDEMIA, UNSPECIFIED HYPERLIPIDEMIA TYPE: ICD-10-CM

## 2022-10-10 DIAGNOSIS — E66.9 OBESITY (BMI 30-39.9): ICD-10-CM

## 2022-10-10 DIAGNOSIS — F43.9 STRESS: ICD-10-CM

## 2022-10-10 PROCEDURE — 3079F DIAST BP 80-89 MM HG: CPT | Performed by: NURSE PRACTITIONER

## 2022-10-10 PROCEDURE — 99213 OFFICE O/P EST LOW 20 MIN: CPT | Performed by: NURSE PRACTITIONER

## 2022-10-10 PROCEDURE — 3008F BODY MASS INDEX DOCD: CPT | Performed by: NURSE PRACTITIONER

## 2022-10-10 PROCEDURE — 3075F SYST BP GE 130 - 139MM HG: CPT | Performed by: NURSE PRACTITIONER

## 2022-10-10 RX ORDER — METFORMIN HYDROCHLORIDE 500 MG/1
500 TABLET, EXTENDED RELEASE ORAL 2 TIMES DAILY WITH MEALS
Qty: 60 TABLET | Refills: 2 | Status: SHIPPED | OUTPATIENT
Start: 2022-10-10

## 2022-10-10 RX ORDER — PHENTERMINE HYDROCHLORIDE 37.5 MG/1
37.5 TABLET ORAL
Qty: 30 TABLET | Refills: 2 | Status: SHIPPED | OUTPATIENT
Start: 2022-10-10

## 2022-10-10 NOTE — PATIENT INSTRUCTIONS
We are here to support you with weight loss, but please remember that you still need your primary care provider for your routine health maintenance. PLAN:  Will continue with phentermine and metformin    Follow up with me in 12 weeks  Schedule follow up appointments: Julio C Dubose (dietitian) or Marnie Pringle (presurgery dietitian)   Check for insurance coverage for dietitian and labwork prior to scheduling appointment. Please try to work on the following dietary changes:  1. Goals: Aim for 20-30 grams of protein/ meal  i. Aim for 100 grams of carbohydrates/day  ii. Eat 4-6 vegetables/day  iii. Avoid skipping meals- eat every 4-5 hours  iv. Aim for 3 meals/day  2. Drink lots of water and cut down on soda/juice consumption if soda/juice drinker  3. Focus on protein: (15-30 grams with each meal) ie. greek yogurt, cottage cheese, string cheese, hard boiled eggs  4. Healthy snacks: peanut butter and apples, hummus and carrots, berries, nuts (1/4 cup), tuna and crackers                 Protein Shakes: Premier protein or Core Power                Protein Bars: Rx Bars, Oatmega, Power Crunch                 S[place]ento balanced breaks (cheese and nuts)- without chocolate  5. Reduce carbohydrates which includes sweets as well as rice, pasta, potatoes, bread, corn and instead choose whole grain options or more protein or vegetables (4-6 servings of vegetables per day)  6. Get a good night of sleep  7. Try to decrease stress in life     Please download apps:  1. \"My Fitness Pal\" (other option is Lose it)) to help you to monitor daily dietary intake and you will be able to see if you are eating the right amount of calories, protein, carbs                With My Fitness Pal-->When you set-up the karla or need to adjust settings:                Goals should include:                  Lose 1.5-2 lbs per week                Activity level: not very active (can't count exercise towards calorie number per day)                   ** Daily INPUT> Look at nutrition section-- \"nutrients\" and it will break down your macros for the day (ie. Protein, carbs, fibers, sugars and fats). Try to stay within these numbers daily     2. \"7 minute workout\" to help with exercise/activity which takes 7 minutes of your day and that you can do at home! 3. \"Calm\" or \"Headspace\" which helps with mindfulness, meditation, clarity, sleep, and laura to your daily life. 4. [employer] blog for healthy recipe ideas  5. [employer] for low carb resources    HIGH PROTEIN SNACK IDEAS  -cottage cheese  -plain yogurt  -kefir  -hard-boiled eggs  -natural cheeses  -nuts (measure portion size)   -unsweetened nut butters  -dried edamame   -linda seeds soaked in water or almond milk  -soy nuts  -cured meats (monitor for sodium issues)   -hummus with vegetables  -bean dip with vegetables     FRUIT  Low carb fruit options   Raspberries: Half a cup (60 grams) contains 3 grams of carbs. Blackberries: Half a cup (70 grams) contains 4 grams of carbs. Strawberries: Half a cup (100 grams) contains 6 grams of carbs. Blueberries: Half a cup (50 grams) contains 6 grams of carbs. Plum: One medium-sized (80 grams) contains 6 grams of carbs.      VEGETABLES  Low carb vegetables

## 2023-02-13 ENCOUNTER — OFFICE VISIT (OUTPATIENT)
Dept: INTERNAL MEDICINE CLINIC | Facility: CLINIC | Age: 38
End: 2023-02-13
Payer: COMMERCIAL

## 2023-02-13 VITALS
WEIGHT: 188 LBS | HEART RATE: 80 BPM | DIASTOLIC BLOOD PRESSURE: 84 MMHG | HEIGHT: 62.5 IN | RESPIRATION RATE: 16 BRPM | SYSTOLIC BLOOD PRESSURE: 126 MMHG | BODY MASS INDEX: 33.73 KG/M2

## 2023-02-13 DIAGNOSIS — R63.2 BINGE EATING: ICD-10-CM

## 2023-02-13 DIAGNOSIS — F43.9 STRESS: ICD-10-CM

## 2023-02-13 DIAGNOSIS — E78.5 HYPERLIPIDEMIA, UNSPECIFIED HYPERLIPIDEMIA TYPE: ICD-10-CM

## 2023-02-13 DIAGNOSIS — E66.9 OBESITY (BMI 30-39.9): ICD-10-CM

## 2023-02-13 DIAGNOSIS — R73.03 PREDIABETES: ICD-10-CM

## 2023-02-13 DIAGNOSIS — Z51.81 THERAPEUTIC DRUG MONITORING: Primary | ICD-10-CM

## 2023-02-13 DIAGNOSIS — E55.9 VITAMIN D DEFICIENCY: ICD-10-CM

## 2023-02-13 PROCEDURE — 99214 OFFICE O/P EST MOD 30 MIN: CPT | Performed by: NURSE PRACTITIONER

## 2023-02-13 PROCEDURE — 3074F SYST BP LT 130 MM HG: CPT | Performed by: NURSE PRACTITIONER

## 2023-02-13 PROCEDURE — 3079F DIAST BP 80-89 MM HG: CPT | Performed by: NURSE PRACTITIONER

## 2023-02-13 PROCEDURE — 3008F BODY MASS INDEX DOCD: CPT | Performed by: NURSE PRACTITIONER

## 2023-02-13 RX ORDER — METFORMIN HYDROCHLORIDE 500 MG/1
500 TABLET, EXTENDED RELEASE ORAL 2 TIMES DAILY WITH MEALS
Qty: 60 TABLET | Refills: 2 | Status: CANCELLED | OUTPATIENT
Start: 2023-02-13

## 2023-02-13 RX ORDER — PHENTERMINE HYDROCHLORIDE 15 MG/1
15 CAPSULE ORAL EVERY MORNING
Qty: 30 CAPSULE | Refills: 1 | Status: SHIPPED | OUTPATIENT
Start: 2023-02-13

## 2023-02-13 RX ORDER — PHENTERMINE HYDROCHLORIDE 37.5 MG/1
37.5 TABLET ORAL
Qty: 30 TABLET | Refills: 2 | Status: CANCELLED | OUTPATIENT
Start: 2023-02-13

## 2023-02-13 NOTE — PATIENT INSTRUCTIONS
Next steps:  1. Fill your prescribed medication and take as discussed and prescribed:   Will trial wegovy 0.25mg weekly X 4 weeks (sample) and send in a Constant Contact message in 2-3 weeks to see if you would like the 0.5mg dose  Will reduce phentermine 15mg   2. Schedule a personal nutrition consultation with one of our registered dieticians     Please try to work on the following dietary changes:    1. Drink water with meals and throughout the day, cut down on soda and/or juice if consumed. Consider flavored water options like Bubbly, Spindrift, Hint and Shay. 2.  Eat breakfast daily and focus on having protein with each meal, examples include: greek yogurt, cottage cheese, hard boiled egg, whole grain toast with peanut butter. 3.  Reduce refined carbohydrates and sugars which includes items such as sweets, as well as rice, pasta, and bread and make sure to choose whole grain options when having them with just 1 serving per meal about the size of your inner palm. 4.  Consume non starchy veggies daily working towards making them a good 50% of your daily food intake. Add them to lunch and dinner consistently. 5.  Start a daily probiotic: VSL#3 is recommended, (order on line at www.vsl3. com). Take 1 capsule daily with water for 30 days, then reduce to 1 every other day (this will reduce the cost). Capsules can be left out for 2 weeks, but then must be refrigerated. Please download karla My Fitness Orvel Dubin! Or Net Diary to monitor daily dietary intake and you will be able to see if you are eating the right amount of calories or too much or too little which would hinder weight loss. Additionally this will help to see your daily carbohydrate and protein intake. When you set the karla up choose 1-2 lbs/week as a goal.  Keeping a paper food journal is an option as well to remain accountable for your choices- this is the start to mindful eating!  A low calorie diet has been consistently shown to support weight loss.     Continue or start exercising to help establish a routine. If not already exercising begin with 1 day and progress as able with long-term goal of 30 minutes 5 days a week at a minimum. Meditation daily can help manage and control stress. Chronic stress can make weight loss difficult. Exercising is one way to help with stress, but meditation using the CALM Araceli or another comparable alternative can be done in your home or place of work with little time commitment. This Araceli can also help work on behavior change and improve sleep. Check out the segment under Calm Masterclass and listen to The 4 Pillars of Health. A great way to begin learning about the foundation of lifestyle with practical tips to use in your every day. Check out www.yourweightmatters. org blog for continued daily support and education along this weight loss journey! Patient Resources:     Personal Training/Fitness Classes/Health Coaching     Scott Saleh and Dave Marco Abilly @ [email or web address] Full fitness center with group fitness and personal training. Discount available as client of [facility]. Health Coaching and Personal Training with Matt Cavanaugh at our [facility]- individual weekly coaching with option to add personal training and small group fitness classes targeted at weight loss- [phone] and/or email @ [email or web address]  360FIT [place] [email or web address] Group Fitness [phone] and/or email Isabela Nunez at [email or web address]  [address] [facility] with multiple locations: Aetna (www.Divide. Legend Silicon), Eat The Frog Fitness (www.Yast. Legend Silicon), Fit Body Bootcamp (www.Sendmeboxbodybootcamp.Legend Silicon), Vivendy Therapeutics Fitness (www.MerchMe. Legend Silicon), The Exercise  (www.exercisecoach.Legend Silicon)     Online Fitness  Fitness  on Whole Foods in 10 DVD series- www. qlzoy76PYJ. Legend Silicon  Sit and Be Fit - Chair exercise series Www.sitandbefit. org  Hip Hop Fit with Pedro Ravi at www.hiphopfit. net     Apps for on the [place] 7 Minute Workout (orange box with white 7) - free on the go HIIT training araceli  Peloton Araceli @ Casentric     Nutrition Trackers and Tools  LoseIT! And My Fitness Pal apps and on line for tracking nutrition  NOOM - virtual health coaching  FitFoundation (healthy meals on the go) in [facility] @ www. hhfubewiicjwy1f. Nelly Cooks MD @ www.bistromdeRelyx and Antonia Ambriz (keto and low carb plans recommended) @ www. OTKWCN60.ZSW, Metabolic Meals @ www. InnometricsMetabolicMeals. com - individual prepared meals to go  Coopers Sports Picks, BasicGov Systems, International Business Machines, Every Plate, Tego- on line meal delivery programs for preparation at home  AK "TurnHere[employer] in [place] for homemade meals to go @ wwwSmithers Avanza  Diet Doctor @ www. dietdoctor. com - low carb swaps  Yummly - meal prep and planning araceli (www.yummly. com)     Stress Management/Behavior/Mindful Eating  CALM meditation araceli (www.calmAllegorithmic)  Headspace  Am I Hungry? Mindful eating virtual  araceli  Www.yourweightmatters. org - Obesity Action Coalition sponsored Blog posts daily  Motivation araceli (black box with white \")- daily supportive messages sent to your phone     Books/Video Education/Podcasts  Mindless Eating by Leila Lino  Why We Get Sick by Carter Naidu (a book about insulin resistance)  Atomic Habits by Raven Seth (a book about taking small steps to promote greater behavior change)   Can't Hurt Me by Catherine Hoffman (a book exploring the power of discipline in achieving your goals)  The End of Dieting: How to Live for Life by Dr. Cheryl Ortiz M.D. or listen to The [address] Episode 61: Understanding \"Nutritarian\" Eating w/Dr. Cheryl Ortiz  Your Body in Balance:  The World Fuel Services Corporation of Food, Hormones, and Health by Dr. Juanito Harris  The Menopause Diet Plan by Greta Jewell [facility] AT [facility]  The Complete Guide to fasting by Dr. Jaime Ward, Salt & Fat by Shirlene Palomino, Ph.D, R.D. Weight Loss Surgery Will Not Treat Food Addiction by Elgin Castro Ph.D  The [address] [place] [address] on plant based nutrition  Fed Up - documentary about obesity (Free on [place])  The Truth About Sugar - documentary on sugar (Free on [place], https://youtu. be/3I5mbrlSY2x)  The Dr. Fulton Minium by Dr. Erika Zavala MD  Fitlosophy Fitspiration - journal to better health (found at [employer] in fitness aisle)  What Happened to You?- a look at the impact trauma has on behavior written by Marietta Mccray and Dr. Diamond Kirk Again by Lily Servin - discovering your true self after trauma  Yin Akbar talk on Netflix, The Call to Courage  Podcasts: The Exam Room by the Physician's Committee, Nutrition Facts by Dr. Attila Garcia    We are here to support you with weight loss, but please remember that you still need your primary care provider for your routine health maintenance.

## 2023-02-23 ENCOUNTER — PATIENT MESSAGE (OUTPATIENT)
Dept: INTERNAL MEDICINE CLINIC | Facility: CLINIC | Age: 38
End: 2023-02-23

## 2023-03-19 NOTE — TELEPHONE ENCOUNTER
Requesting Wegovy  LOV: 2/13/23  RTC: not noted  Last Relevant Labs: na  Filled: 2/13/23 #2ml with 0 refills  wegovy 0.25 mg    Future Appointments   Date Time Provider Baltazar Hopkins   4/10/2023  2:30 PM Pearline Paget, MD [facility] [id number]

## 2023-03-20 ENCOUNTER — TELEPHONE (OUTPATIENT)
Dept: INTERNAL MEDICINE CLINIC | Facility: CLINIC | Age: 38
End: 2023-03-20

## 2023-03-20 ENCOUNTER — TELEPHONE (OUTPATIENT)
Dept: ORTHOPEDICS CLINIC | Facility: CLINIC | Age: 38
End: 2023-03-20

## 2023-03-20 DIAGNOSIS — M25.521 RIGHT ELBOW PAIN: Primary | ICD-10-CM

## 2023-03-20 NOTE — TELEPHONE ENCOUNTER
Pa DONE IN [facility]. AWAITING ON DECISION,    Obesity (BMI 30-39. 9)  E66.9    Prediabetes  R73.03    Binge eating  R63.2    Hyperlipidemia, unspecified hyperlipidemia type  E78.5

## 2023-03-22 NOTE — TELEPHONE ENCOUNTER
Approved  Wegovy 0.5 mg    Approved    Prior authorization approved Case ID: [id number]      Payer:  [address] [place] [address]  [phone]  [place]  [phone]    DEATNJ:[id number];SGZHEK:[id number]; Review Type:Prior Auth; Coverage Start Date:02/18/2023; Coverage End Date:10/16/2023;   Approval Details    Authorized from February 18, 2023 to October 16, 2023      Electronic appeal:  Not supported   View History     Medication Being Authorized     semaglutide-weight management 0.5 MG/0.5ML Subcutaneous Solution Auto-injector    Inject 0.5 mL (0.5 mg total) into the skin once a week for 4 doses.

## 2023-03-24 ENCOUNTER — HOSPITAL ENCOUNTER (OUTPATIENT)
Dept: GENERAL RADIOLOGY | Age: 38
Discharge: HOME | End: 2023-03-24
Attending: ORTHOPAEDIC SURGERY
Payer: COMMERCIAL

## 2023-03-24 DIAGNOSIS — M25.521 RIGHT ELBOW PAIN: ICD-10-CM

## 2023-03-24 PROCEDURE — 73080 X-RAY EXAM OF ELBOW: CPT | Performed by: ORTHOPAEDIC SURGERY

## 2023-04-03 ENCOUNTER — OFFICE VISIT (OUTPATIENT)
Dept: INTERNAL MEDICINE CLINIC | Facility: CLINIC | Age: 38
End: 2023-04-03
Payer: COMMERCIAL

## 2023-04-03 ENCOUNTER — OFFICE VISIT (OUTPATIENT)
Dept: ORTHOPEDICS CLINIC | Facility: CLINIC | Age: 38
End: 2023-04-03
Payer: COMMERCIAL

## 2023-04-03 VITALS — BODY MASS INDEX: 34.04 KG/M2 | WEIGHT: 185 LBS | HEIGHT: 62 IN

## 2023-04-03 VITALS
WEIGHT: 186 LBS | OXYGEN SATURATION: 99 % | DIASTOLIC BLOOD PRESSURE: 80 MMHG | HEIGHT: 62.5 IN | HEART RATE: 105 BPM | SYSTOLIC BLOOD PRESSURE: 128 MMHG | RESPIRATION RATE: 16 BRPM | BODY MASS INDEX: 33.37 KG/M2

## 2023-04-03 DIAGNOSIS — E66.9 OBESITY (BMI 30-39.9): ICD-10-CM

## 2023-04-03 DIAGNOSIS — R73.03 PREDIABETES: ICD-10-CM

## 2023-04-03 DIAGNOSIS — E55.9 VITAMIN D DEFICIENCY: ICD-10-CM

## 2023-04-03 DIAGNOSIS — E78.5 HYPERLIPIDEMIA, UNSPECIFIED HYPERLIPIDEMIA TYPE: ICD-10-CM

## 2023-04-03 DIAGNOSIS — R63.2 BINGE EATING: ICD-10-CM

## 2023-04-03 DIAGNOSIS — M77.11 LATERAL EPICONDYLITIS OF RIGHT ELBOW: Primary | ICD-10-CM

## 2023-04-03 DIAGNOSIS — Z51.81 THERAPEUTIC DRUG MONITORING: Primary | ICD-10-CM

## 2023-04-03 DIAGNOSIS — F43.9 STRESS: ICD-10-CM

## 2023-04-03 PROCEDURE — 3008F BODY MASS INDEX DOCD: CPT | Performed by: NURSE PRACTITIONER

## 2023-04-03 PROCEDURE — 3074F SYST BP LT 130 MM HG: CPT | Performed by: NURSE PRACTITIONER

## 2023-04-03 PROCEDURE — 99204 OFFICE O/P NEW MOD 45 MIN: CPT | Performed by: ORTHOPAEDIC SURGERY

## 2023-04-03 PROCEDURE — 99213 OFFICE O/P EST LOW 20 MIN: CPT | Performed by: NURSE PRACTITIONER

## 2023-04-03 PROCEDURE — 3008F BODY MASS INDEX DOCD: CPT | Performed by: ORTHOPAEDIC SURGERY

## 2023-04-03 PROCEDURE — 3079F DIAST BP 80-89 MM HG: CPT | Performed by: NURSE PRACTITIONER

## 2023-04-03 NOTE — PATIENT INSTRUCTIONS
Next steps:  1. Fill your prescribed medication and take as discussed and prescribed: phentermine   2. Schedule a personal nutrition consultation with one of our registered dieticians       1. Drink water with meals and throughout the day, cut down on soda and/or juice if consumed. Consider flavored water options like Bubbly, Spindrift, Hint and Shay. 2.  Eat breakfast daily and focus on having protein with each meal, examples include: greek yogurt, cottage cheese, hard boiled egg, whole grain toast with peanut butter. 3.  Reduce refined carbohydrates and sugars which includes items such as sweets, as well as rice, pasta, and bread and make sure to choose whole grain options when having them with just 1 serving per meal about the size of your inner palm. 4.  Consume non starchy veggies daily working towards making them a good 50% of your daily food intake. Add them to lunch and dinner consistently. 5.  Start a daily probiotic: VSL#3 is recommended, (order on line at www.vsl3. com). Take 1 capsule daily with water for 30 days, then reduce to 1 every other day (this will reduce the cost). Capsules can be left out for 2 weeks, but then must be refrigerated. Please download karla My Fitness [place] Rikki! Or Net Diary to monitor daily dietary intake and you will be able to see if you are eating the right amount of calories or too much or too little which would hinder weight loss. Additionally this will help to see your daily carbohydrate and protein intake. When you set the karla up choose 1-2 lbs/week as a goal.  Keeping a paper food journal is an option as well to remain accountable for your choices- this is the start to mindful eating! A low calorie diet has been consistently shown to support weight loss. Continue or start exercising to help establish a routine. If not already exercising begin with 1 day and progress as able with long-term goal of 30 minutes 5 days a week at a minimum.      Meditation daily can help manage and control stress. Chronic stress can make weight loss difficult. Exercising is one way to help with stress, but meditation using the CALM Araceli or another comparable alternative can be done in your home or place of work with little time commitment. This Araceli can also help work on behavior change and improve sleep. Check out the segment under Calm Masterclass and listen to The 4 Pillars of Health. A great way to begin learning about the foundation of lifestyle with practical tips to use in your every day. Check out www.yourweightmatters. org blog for continued daily support and education along this weight loss journey! Patient Resources:     Personal Training/Fitness Classes/Health Coaching     Scott 112 and Dave Sophiaside @ [email or web address]/ Full fitness center with group fitness and personal training. Discount available as client of [facility]. Health Coaching and Personal Training with Matt Cavanaugh at our [facility]- individual weekly coaching with option to add personal training and small group fitness classes targeted at weight loss- [phone] and/or email @ Michael Nelson@"NephoScale, [email or web address].". org  360FIT [place] [email or web address] Group Fitness [phone] and/or email Isabela Nunez at Yohannes@"NephoScale, Inc.". com  [address] [facility] with multiple locations: Aetna (www.Platial), Eat The Soma (www.Packetmotion. Powa Technologies), Fit Body Bootcamp (www.StyleZenp.Powa Technologies), Animal [place] (www.Community Peace Developers), The Exercise  (www.exercisecoach.Powa Technologies)     Online Fitness  Fitness  on Whole Foods in 10 DVD series- www. hrzoz36MFR. Powa Technologies  Sit and Be Fit - Chair exercise series Www.sitandbefit. org  Hip Hop Fit with Pedro Ravi at www.hiphopfit. net     Apps for on the Little Black Bag 7 Minute Workout (orange box with white 7) - free on the go HIIT training araceli  Peloton Araceli @ www. Beijing Joy China Network     Nutrition Trackers and Tools  LoseIT! And My Fitness Pal apps and on line for tracking nutrition  NOOM - virtual health coaching  FitFoundation (healthy meals on the go) in [facility] @ www. zpwreljfbzhjf3p. Beatris Taylor MD @ www.PulmOned.com and Autumn De Guzman (keto and low carb plans recommended) @ www. HHLYYY82.[facility], Metabolic Meals @ www. MyMetabolicMeals. com - individual prepared meals to go  ei Technologies, Glasshouse International, International Business Machines, Every Plate, CrowdScannerr- on line meal delivery programs for preparation at home  AK Track the Bet in [place] for homemade meals to go @ wwwKarma Platform  Diet Doctor @ www. dietdoctor. Quotient Biodiagnostics - low carb swaps  Yummly - meal prep and planning araceli (www.yummly. com)     Stress Management/Behavior/Mindful Eating  CALM meditation araceli (www.ePetWorld)  Headspace  Am I Hungry? Mindful eating virtual  araceli  Www.yourweightmatters. org - Obesity Action Coalition sponsored Blog posts daily  Motivation araceli (black box with white \")- daily supportive messages sent to your phone     Books/Video Education/Podcasts  Mindless Eating by Shamar Banks  Why We Get Sick by Tye Osborne (a book about insulin resistance)  Atomic Habits by Chichi Henning (a book about taking small steps to promote greater behavior change)   Can't Hurt Me by Shirley Morrison (a book exploring the power of discipline in achieving your goals)  The End of Dieting: How to Live for Life by Dr. Krupa Michelle M.D. or listen to The [address] [facility] Episode 61: Understanding \"Nutritarian\" Eating w/Dr. Krupa Michelle  Your Body in Balance: The World Fuel Services Corporation of Food, Hormones, and Health by Dr. Vamshi Clay  The Menopause Diet Plan by Sg Hayward and [facility] HOSP AT [facility]  The Complete Guide to fasting by Dr. Tien Lipscomb, [address] [facility] by Shree Jensen, Ph.D, R.D.   Weight Loss Surgery Will Not Treat Food Addiction by Manoj Fuller Ph.D  The Game Changers- Yast Documentary on plant based nutrition  Fed Up - documentary about obesity (Free on Utube)  The Truth About Sugar - documentary on sugar (Free on [place], https://youtu. be/6B9zkikHR3s)  The Dr. Conner Rojo by Dr. Frank Vicente MD  Fitlosophy Fitspiration - journal to better health (found at [employer] in fitness aisle)  What Happened to You?- a look at the impact trauma has on behavior written by Rick Au and Dr. Zaidi Carrier Again by Moshe Flores - discovering your true self after trauma  Desmond Freeman talk on Food Matters Markets, The Call to Courage  Podcasts: The Exam Room by the Physician's Committee, Nutrition Facts by Dr. Yoshi Graham    We are here to support you with weight loss, but please remember that you still need your primary care provider for your routine health maintenance.

## 2023-05-05 ENCOUNTER — PATIENT MESSAGE (OUTPATIENT)
Dept: INTERNAL MEDICINE CLINIC | Facility: CLINIC | Age: 38
End: 2023-05-05

## 2023-05-08 RX ORDER — PHENTERMINE HYDROCHLORIDE 15 MG/1
15 CAPSULE ORAL EVERY MORNING
Qty: 30 CAPSULE | Refills: 1 | Status: CANCELLED
Start: 2023-05-08

## 2023-05-08 NOTE — TELEPHONE ENCOUNTER
From: Laney Meckel  To: MELVA Tian  Sent: 5/5/2023 1:19 PM CDT  Subject: Medication    [facility] said injectable medications would be by review so it is strictly dependent on that review. The rep said one for the daily injection would have to be submitted by the office. Medications do not typically apply to the deductible on our plan. I may call a different day and get a different rep because they never are consistent. In the meantime I was prescribe the half dose of the phentermine and have been out of everything for a few weeks. I guess I just need to know how to proceed from a medication/interim standpoint. I was told to follow up in 8 weeks. Please advise. Thanks for your help in advance.

## 2023-05-10 RX ORDER — METFORMIN HYDROCHLORIDE 500 MG/1
500 TABLET, EXTENDED RELEASE ORAL 2 TIMES DAILY WITH MEALS
Qty: 60 TABLET | Refills: 1 | Status: SHIPPED | OUTPATIENT
Start: 2023-05-10

## 2023-05-10 RX ORDER — PHENTERMINE HYDROCHLORIDE 37.5 MG/1
37.5 TABLET ORAL
Qty: 30 TABLET | Refills: 1 | Status: SHIPPED | OUTPATIENT
Start: 2023-05-10

## 2023-06-16 ENCOUNTER — OFFICE VISIT (OUTPATIENT)
Dept: INTERNAL MEDICINE CLINIC | Facility: CLINIC | Age: 38
End: 2023-06-16
Payer: COMMERCIAL

## 2023-06-16 VITALS
DIASTOLIC BLOOD PRESSURE: 78 MMHG | HEART RATE: 76 BPM | WEIGHT: 192.81 LBS | SYSTOLIC BLOOD PRESSURE: 128 MMHG | HEIGHT: 62.75 IN | TEMPERATURE: 98 F | RESPIRATION RATE: 12 BRPM | BODY MASS INDEX: 34.6 KG/M2

## 2023-06-16 DIAGNOSIS — J02.9 SORE THROAT: Primary | ICD-10-CM

## 2023-06-16 LAB
CONTROL LINE PRESENT WITH A CLEAR BACKGROUND (YES/NO): YES YES/NO
KIT LOT #: NORMAL NUMERIC

## 2023-06-16 PROCEDURE — 3008F BODY MASS INDEX DOCD: CPT | Performed by: NURSE PRACTITIONER

## 2023-06-16 PROCEDURE — 87081 CULTURE SCREEN ONLY: CPT | Performed by: NURSE PRACTITIONER

## 2023-06-16 PROCEDURE — 3074F SYST BP LT 130 MM HG: CPT | Performed by: NURSE PRACTITIONER

## 2023-06-16 PROCEDURE — 3078F DIAST BP <80 MM HG: CPT | Performed by: NURSE PRACTITIONER

## 2023-06-16 PROCEDURE — 99213 OFFICE O/P EST LOW 20 MIN: CPT | Performed by: NURSE PRACTITIONER

## 2023-06-16 PROCEDURE — 87880 STREP A ASSAY W/OPTIC: CPT | Performed by: NURSE PRACTITIONER

## 2023-06-16 RX ORDER — AZITHROMYCIN 250 MG/1
TABLET, FILM COATED ORAL
Qty: 6 TABLET | Refills: 0 | Status: SHIPPED | OUTPATIENT
Start: 2023-06-16 | End: 2023-06-21

## 2023-07-07 RX ORDER — METFORMIN HYDROCHLORIDE 500 MG/1
TABLET, EXTENDED RELEASE ORAL
Qty: 60 TABLET | Refills: 1 | Status: SHIPPED | OUTPATIENT
Start: 2023-07-07

## 2023-07-07 NOTE — TELEPHONE ENCOUNTER
Requesting   Requested Prescriptions     Pending Prescriptions Disp Refills    METFORMIN  MG Oral Tablet 24 Hr [Pharmacy Med Name: METFORMIN ER 500MG 24HR TABS] 60 tablet 1     Sig: TAKE 1 TABLET(500 MG) BY MOUTH TWICE DAILY WITH MEALS     LOV: 4/3/23  RTC: 8 weeks  Filled: 5/10/23 #30 with 1 refills    Future Appointments   Date Time Provider Baltazar Hopkins   8/16/2023 11:00 AM MELVA Basurto [address]   8/18/2023 11:20 AM Chinmay Albright DO PM[facility] Zoie Chan

## 2023-08-16 ENCOUNTER — OFFICE VISIT (OUTPATIENT)
Dept: INTERNAL MEDICINE CLINIC | Facility: CLINIC | Age: 38
End: 2023-08-16
Payer: COMMERCIAL

## 2023-08-16 VITALS
RESPIRATION RATE: 20 BRPM | BODY MASS INDEX: 34.63 KG/M2 | OXYGEN SATURATION: 98 % | HEART RATE: 108 BPM | WEIGHT: 193 LBS | TEMPERATURE: 98 F | HEIGHT: 62.5 IN | SYSTOLIC BLOOD PRESSURE: 124 MMHG | DIASTOLIC BLOOD PRESSURE: 76 MMHG

## 2023-08-16 VITALS
HEIGHT: 62.5 IN | BODY MASS INDEX: 34.63 KG/M2 | DIASTOLIC BLOOD PRESSURE: 78 MMHG | HEART RATE: 74 BPM | SYSTOLIC BLOOD PRESSURE: 126 MMHG | RESPIRATION RATE: 16 BRPM | WEIGHT: 193 LBS

## 2023-08-16 DIAGNOSIS — Z13.1 SCREENING FOR DIABETES MELLITUS: ICD-10-CM

## 2023-08-16 DIAGNOSIS — E55.9 VITAMIN D DEFICIENCY: ICD-10-CM

## 2023-08-16 DIAGNOSIS — Z00.00 ENCOUNTER FOR ANNUAL PHYSICAL EXAM: Primary | ICD-10-CM

## 2023-08-16 DIAGNOSIS — E78.5 HYPERLIPIDEMIA, UNSPECIFIED HYPERLIPIDEMIA TYPE: ICD-10-CM

## 2023-08-16 DIAGNOSIS — E66.9 OBESITY (BMI 30-39.9): ICD-10-CM

## 2023-08-16 DIAGNOSIS — F98.8 ATTENTION DEFICIT DISORDER, UNSPECIFIED HYPERACTIVITY PRESENCE: ICD-10-CM

## 2023-08-16 DIAGNOSIS — Z13.29 SCREENING FOR THYROID DISORDER: ICD-10-CM

## 2023-08-16 DIAGNOSIS — E53.8 B12 DEFICIENCY: ICD-10-CM

## 2023-08-16 DIAGNOSIS — K21.9 GASTROESOPHAGEAL REFLUX DISEASE, UNSPECIFIED WHETHER ESOPHAGITIS PRESENT: ICD-10-CM

## 2023-08-16 DIAGNOSIS — Z13.220 SCREENING FOR CHOLESTEROL LEVEL: ICD-10-CM

## 2023-08-16 DIAGNOSIS — F41.9 ANXIETY: ICD-10-CM

## 2023-08-16 DIAGNOSIS — Z51.81 THERAPEUTIC DRUG MONITORING: Primary | ICD-10-CM

## 2023-08-16 DIAGNOSIS — R73.03 PREDIABETES: ICD-10-CM

## 2023-08-16 DIAGNOSIS — F43.9 STRESS: ICD-10-CM

## 2023-08-16 PROBLEM — E04.9 GOITER: Status: RESOLVED | Noted: 2022-03-10 | Resolved: 2023-08-16

## 2023-08-16 PROBLEM — R03.0 ELEVATED BLOOD PRESSURE READING: Status: RESOLVED | Noted: 2022-03-24 | Resolved: 2023-08-16

## 2023-08-16 PROCEDURE — 3078F DIAST BP <80 MM HG: CPT | Performed by: NURSE PRACTITIONER

## 2023-08-16 PROCEDURE — 99214 OFFICE O/P EST MOD 30 MIN: CPT | Performed by: NURSE PRACTITIONER

## 2023-08-16 PROCEDURE — 3074F SYST BP LT 130 MM HG: CPT | Performed by: NURSE PRACTITIONER

## 2023-08-16 PROCEDURE — 99395 PREV VISIT EST AGE 18-39: CPT | Performed by: NURSE PRACTITIONER

## 2023-08-16 PROCEDURE — 3008F BODY MASS INDEX DOCD: CPT | Performed by: NURSE PRACTITIONER

## 2023-08-16 RX ORDER — FAMOTIDINE 40 MG/1
40 TABLET, FILM COATED ORAL DAILY
Qty: 90 TABLET | Refills: 0 | Status: SHIPPED | OUTPATIENT
Start: 2023-08-16

## 2023-08-16 RX ORDER — METFORMIN HYDROCHLORIDE 500 MG/1
500 TABLET, EXTENDED RELEASE ORAL 2 TIMES DAILY WITH MEALS
Qty: 60 TABLET | Refills: 2 | Status: SHIPPED | OUTPATIENT
Start: 2023-08-16

## 2023-08-16 RX ORDER — PHENTERMINE HYDROCHLORIDE 37.5 MG/1
37.5 TABLET ORAL
Qty: 30 TABLET | Refills: 2 | Status: SHIPPED | OUTPATIENT
Start: 2023-08-16

## 2023-08-16 NOTE — PATIENT INSTRUCTIONS
Next steps:  1. Fill your prescribed medication and take as discussed and prescribed: phentermine and metformin (restart)  2. Schedule a personal nutrition consultation with one of our registered dieticians     Please try to work on the following dietary changes:  Daily protein recommendation to start:   grams  Daily carbohydrate: <110g  Daily calories: 1,300  1. Drink water with meals and throughout the day, cut down on soda and/or juice if consumed. Consider flavored water options like Bubbly, Spindrift, Hint and Shay. 2.  Eat breakfast daily and focus on having protein with each meal, examples include: greek yogurt, cottage cheese, hard boiled egg, whole grain toast with peanut butter. 3.  Reduce refined carbohydrates and sugars which includes items such as sweets, as well as rice, pasta, and bread and make sure to choose whole grain options when having them with just 1 serving per meal about the size of your inner palm. 4.  Consume non starchy veggies daily working towards making them a good 50% of your daily food intake. Add them to lunch and dinner consistently. 5.  Start a daily probiotic: VSL#3 is recommended, (order on line at www.vsl3. com). Take 1 capsule daily with water for 30 days, then reduce to 1 every other day (this will reduce the cost). Capsules can be left out for 2 weeks, but then must be refrigerated. Please download karla My Fitness [place]! Or Net Diary to monitor daily dietary intake and you will be able to see if you are eating the right amount of calories or too much or too little which would hinder weight loss. Additionally this will help to see your daily carbohydrate and protein intake. When you set the karla up choose 1-2 lbs/week as a goal.  Keeping a paper food journal is an option as well to remain accountable for your choices- this is the start to mindful eating! A low calorie diet has been consistently shown to support weight loss.      Continue or start exercising to help establish a routine. If not already exercising begin with 1 day and progress as able with long-term goal of 30 minutes 5 days a week at a minimum. Meditation daily can help manage and control stress. Chronic stress can make weight loss difficult. Exercising is one way to help with stress, but meditation using the CALM Araceli or another comparable alternative can be done in your home or place of work with little time commitment. This Araceli can also help work on behavior change and improve sleep. Check out the segment under Calm Masterclass and listen to The 4 Pillars of Health. A great way to begin learning about the foundation of lifestyle with practical tips to use in your every day. Check out www.yourweightmatters. org blog for continued daily support and education along this weight loss journey! Patient Resources:     Personal Training/Fitness Classes/Health Coaching     Scott Saleh and Dave Sophiaside @ [email or web address]/ Full fitness center with group fitness and personal training. Discount available as client of [facility]. Health Coaching and Personal Training with Michelle Fields at our [facility]- individual weekly coaching with option to add personal training and small group fitness classes targeted at weight loss- [phone] and/or email @ [email or web address]  360FIT Avis [email or web address] Group Fitness [phone] and/or email Ericka Espinoza at [email or web address]  [address] [facility] with multiple locations: Aetna (www.Bankofpoker), Eat The Frog Fitness (www.Web and Rank. GovDelivery), Fit Body Bootcamp (www.VYoubodybootcamp.com), EndoGastric Solutions Fitness (www.American Life Media. GovDelivery), The Exercise  (www.exercisecoach.GovDelivery)     Online Fitness  Fitness  on Whole Foods in 10 DVD series- www. ftuvg66VWB. GovDelivery  Sit and Be Fit - Chair exercise series Www.sitandbefit. org  Hip Hop Fit with Pedro Ravi at www.hiphopfit. net     Apps for on the Monolith Sem[employer]ductor 7 Minute Workout (orange box with white 7) - free on the go HIIT training araceli  Peloton Araceli @ wwwXeko     Nutrition Trackers and Tools  LoseIT! And My Fitness Pal apps and on line for tracking nutrition  NOOM - virtual health coaching  FitFoundation (healthy meals on the go) in [facility]-SCI @ www. uluhvlohgpdte5z. Kay Cordero MD @ wwwNixonomdY Combinator and Tomasz Justin (keto and low carb plans recommended) @ www. XGXODD81.XNZ, Metabolic Meals @ www. MaulSoupals. com - individual prepared meals to go  TrueVault, Zilker Labs, International Business Machines, Every Plate, FanDuel- on line meal delivery programs for preparation at home  AK Pixlee in [place] for homemade meals to go @ wwwGiftango. Watchful Software  Diet Doctor @ www. dietdoctor. com - low carb swaps  Yummly - meal prep and planning araceli (www.yummly. com)     Stress Management/Behavior/Mindful Eating  CALM meditation araceli (www.calm. Watchful Software)  Headspace  Am I Hungry? Mindful eating virtual  araceli  Www.yourweightmatters. org - Obesity Action Coalition sponsored Blog posts daily  Motivation araceli (black box with white \")- daily supportive messages sent to your phone     Books/Video Education/Podcasts  Mindless Eating by Paula Bustos  Why We Get Sick by Nazia Davis (a book about insulin resistance)  Atomic Habits by Tova Pack (a book about taking small steps to promote greater behavior change)   Can't Hurt Me by Gabriela Hope (a book exploring the power of discipline in achieving your goals)  The End of Dieting: How to Live for Life by Dr. Angel Lindsey M.D. or listen to The [address] Episode 61: Understanding \"Nutritarian\" Eating w/Dr. Angel Lindsey  Your Body in Balance:  The World Fuel Services Corporation of Food, Hormones, and Health by Dr. Roche Miriam  The Menopause Diet Plan by Ramon Wheat and [facility] HOSP AT [facility]  The Complete Guide to fasting by Dr. Letty Dominguez, [address] [facility] by Lux Foster Godfrey Dugan, Ph.D, R.D. Weight Loss Surgery Will Not Treat Food Addiction by Gera Kumar Ph.D  The [address] [place] [address] on plant based nutrition  Fed Up - documentary about obesity (Free on [place])  The Truth About Sugar - documentary on sugar (Free on Interview Master, https://youtu. be/2Z7nxkaYA9s)  The Dr. Thuy Cuevas by Dr. Rene Ortiz MD  Fitlosophy Fitspiration - journal to better health (found at [employer] in fitness aisle)  What Happened to You?- a look at the impact trauma has on behavior written by Barbara Hahn and Dr. Yoshi Vidales Again by Becca Lux - discovering your true self after trauma  Joel Meneses talk on Pango, The Call to Finomial  Podcasts: The Exam Room by the Physician's Committee, Nutrition Facts by Dr. Vee Arechiga    We are here to support you with weight loss, but please remember that you still need your primary care provider for your routine health maintenance.

## 2023-08-28 ENCOUNTER — LAB ENCOUNTER (OUTPATIENT)
Dept: LAB | Age: 38
End: 2023-08-28
Attending: NURSE PRACTITIONER
Payer: COMMERCIAL

## 2023-08-28 DIAGNOSIS — E53.8 B12 DEFICIENCY: ICD-10-CM

## 2023-08-28 DIAGNOSIS — Z13.1 SCREENING FOR DIABETES MELLITUS: ICD-10-CM

## 2023-08-28 DIAGNOSIS — Z00.00 ENCOUNTER FOR ANNUAL PHYSICAL EXAM: ICD-10-CM

## 2023-08-28 DIAGNOSIS — E55.9 VITAMIN D DEFICIENCY: ICD-10-CM

## 2023-08-28 DIAGNOSIS — Z13.29 SCREENING FOR THYROID DISORDER: ICD-10-CM

## 2023-08-28 DIAGNOSIS — D64.9 LOW HEMOGLOBIN AND LOW HEMATOCRIT: ICD-10-CM

## 2023-08-28 DIAGNOSIS — Z13.220 SCREENING FOR CHOLESTEROL LEVEL: ICD-10-CM

## 2023-08-28 LAB
ALBUMIN SERPL-MCNC: 3.4 G/DL (ref 3.4–5)
ALBUMIN/GLOB SERPL: 0.9 {RATIO} (ref 1–2)
ALP LIVER SERPL-CCNC: 82 U/L
ALT SERPL-CCNC: 25 U/L
ANION GAP SERPL CALC-SCNC: 3 MMOL/L (ref 0–18)
AST SERPL-CCNC: 15 U/L (ref 15–37)
BASOPHILS # BLD AUTO: 0.06 X10(3) UL (ref 0–0.2)
BASOPHILS NFR BLD AUTO: 0.7 %
BILIRUB SERPL-MCNC: 0.3 MG/DL (ref 0.1–2)
BUN BLD-MCNC: 10 MG/DL (ref 7–18)
CALCIUM BLD-MCNC: 8.6 MG/DL (ref 8.5–10.1)
CHLORIDE SERPL-SCNC: 107 MMOL/L (ref 98–112)
CHOLEST SERPL-MCNC: 205 MG/DL (ref ?–200)
CO2 SERPL-SCNC: 26 MMOL/L (ref 21–32)
CREAT BLD-MCNC: 0.95 MG/DL
EGFRCR SERPLBLD CKD-EPI 2021: 78 ML/MIN/1.73M2 (ref 60–?)
EOSINOPHIL # BLD AUTO: 0.2 X10(3) UL (ref 0–0.7)
EOSINOPHIL NFR BLD AUTO: 2.2 %
ERYTHROCYTE [DISTWIDTH] IN BLOOD BY AUTOMATED COUNT: 16.8 %
EST. AVERAGE GLUCOSE BLD GHB EST-MCNC: 117 MG/DL (ref 68–126)
FASTING PATIENT LIPID ANSWER: YES
FASTING STATUS PATIENT QL REPORTED: YES
GLOBULIN PLAS-MCNC: 3.6 G/DL (ref 2.8–4.4)
GLUCOSE BLD-MCNC: 85 MG/DL (ref 70–99)
HBA1C MFR BLD: 5.7 % (ref ?–5.7)
HCT VFR BLD AUTO: 34.5 %
HDLC SERPL-MCNC: 54 MG/DL (ref 40–59)
HGB BLD-MCNC: 10.9 G/DL
IMM GRANULOCYTES # BLD AUTO: 0.03 X10(3) UL (ref 0–1)
IMM GRANULOCYTES NFR BLD: 0.3 %
LDLC SERPL CALC-MCNC: 125 MG/DL (ref ?–100)
LYMPHOCYTES # BLD AUTO: 2.34 X10(3) UL (ref 1–4)
LYMPHOCYTES NFR BLD AUTO: 26.2 %
MCH RBC QN AUTO: 24.4 PG (ref 26–34)
MCHC RBC AUTO-ENTMCNC: 31.6 G/DL (ref 31–37)
MCV RBC AUTO: 77.2 FL
MONOCYTES # BLD AUTO: 0.55 X10(3) UL (ref 0.1–1)
MONOCYTES NFR BLD AUTO: 6.2 %
NEUTROPHILS # BLD AUTO: 5.76 X10 (3) UL (ref 1.5–7.7)
NEUTROPHILS # BLD AUTO: 5.76 X10(3) UL (ref 1.5–7.7)
NEUTROPHILS NFR BLD AUTO: 64.4 %
NONHDLC SERPL-MCNC: 151 MG/DL (ref ?–130)
OSMOLALITY SERPL CALC.SUM OF ELEC: 280 MOSM/KG (ref 275–295)
PLATELET # BLD AUTO: 384 10(3)UL (ref 150–450)
POTASSIUM SERPL-SCNC: 3.6 MMOL/L (ref 3.5–5.1)
PROT SERPL-MCNC: 7 G/DL (ref 6.4–8.2)
RBC # BLD AUTO: 4.47 X10(6)UL
SODIUM SERPL-SCNC: 136 MMOL/L (ref 136–145)
TRIGL SERPL-MCNC: 147 MG/DL (ref 30–149)
TSI SER-ACNC: 2.53 MIU/ML (ref 0.36–3.74)
VIT B12 SERPL-MCNC: 417 PG/ML (ref 193–986)
VIT D+METAB SERPL-MCNC: 30.6 NG/ML (ref 30–100)
VLDLC SERPL CALC-MCNC: 26 MG/DL (ref 0–30)
WBC # BLD AUTO: 8.9 X10(3) UL (ref 4–11)

## 2023-08-28 PROCEDURE — 83036 HEMOGLOBIN GLYCOSYLATED A1C: CPT | Performed by: NURSE PRACTITIONER

## 2023-08-28 PROCEDURE — 80050 GENERAL HEALTH PANEL: CPT | Performed by: NURSE PRACTITIONER

## 2023-08-28 PROCEDURE — 82306 VITAMIN D 25 HYDROXY: CPT | Performed by: NURSE PRACTITIONER

## 2023-08-28 PROCEDURE — 80061 LIPID PANEL: CPT | Performed by: NURSE PRACTITIONER

## 2023-08-28 PROCEDURE — 82728 ASSAY OF FERRITIN: CPT | Performed by: NURSE PRACTITIONER

## 2023-08-28 PROCEDURE — 83550 IRON BINDING TEST: CPT | Performed by: NURSE PRACTITIONER

## 2023-08-28 PROCEDURE — 83540 ASSAY OF IRON: CPT | Performed by: NURSE PRACTITIONER

## 2023-08-28 PROCEDURE — 82607 VITAMIN B-12: CPT | Performed by: NURSE PRACTITIONER

## 2023-08-29 LAB
DEPRECATED HBV CORE AB SER IA-ACNC: 2.6 NG/ML
IRON SATN MFR SERPL: 5 %
IRON SERPL-MCNC: 21 UG/DL
TIBC SERPL-MCNC: 443 UG/DL (ref 240–450)
TRANSFERRIN SERPL-MCNC: 297 MG/DL (ref 200–360)

## 2023-08-30 ENCOUNTER — PATIENT MESSAGE (OUTPATIENT)
Dept: INTERNAL MEDICINE CLINIC | Facility: CLINIC | Age: 38
End: 2023-08-30

## 2023-09-01 ENCOUNTER — OFFICE VISIT (OUTPATIENT)
Dept: INTERNAL MEDICINE CLINIC | Facility: CLINIC | Age: 38
End: 2023-09-01
Payer: COMMERCIAL

## 2023-09-01 VITALS
TEMPERATURE: 97 F | WEIGHT: 193 LBS | OXYGEN SATURATION: 100 % | HEIGHT: 62.5 IN | HEART RATE: 122 BPM | SYSTOLIC BLOOD PRESSURE: 128 MMHG | RESPIRATION RATE: 16 BRPM | DIASTOLIC BLOOD PRESSURE: 84 MMHG | BODY MASS INDEX: 34.63 KG/M2

## 2023-09-01 DIAGNOSIS — D50.0 IRON DEFICIENCY ANEMIA DUE TO CHRONIC BLOOD LOSS: Primary | ICD-10-CM

## 2023-09-01 PROCEDURE — 3079F DIAST BP 80-89 MM HG: CPT | Performed by: NURSE PRACTITIONER

## 2023-09-01 PROCEDURE — 99214 OFFICE O/P EST MOD 30 MIN: CPT | Performed by: NURSE PRACTITIONER

## 2023-09-01 PROCEDURE — 3074F SYST BP LT 130 MM HG: CPT | Performed by: NURSE PRACTITIONER

## 2023-09-01 PROCEDURE — 3008F BODY MASS INDEX DOCD: CPT | Performed by: NURSE PRACTITIONER

## 2023-09-05 ENCOUNTER — TELEPHONE (OUTPATIENT)
Dept: INTERNAL MEDICINE CLINIC | Facility: CLINIC | Age: 38
End: 2023-09-05

## 2023-09-05 ENCOUNTER — PATIENT MESSAGE (OUTPATIENT)
Dept: INTERNAL MEDICINE CLINIC | Facility: CLINIC | Age: 38
End: 2023-09-05

## 2023-09-05 NOTE — TELEPHONE ENCOUNTER
It would be helpful to get it done in my opinion because we can look for bleeding and check for hpylori at the same time. There is risk since it is a procedure but the benefit is higher than the risk. She can get a sec opinion from another GI doc if she wants first. Either a different one in [facility] or at [facility]. Thanks.

## 2023-09-05 NOTE — TELEPHONE ENCOUNTER
Pt is set to have a stomach biopsy on Friday, is very nervous about it, and wants a second opinion on it before they cut anything off of her (new labs to check for iron deficiencies)     Please call her at work: [phone]

## 2023-09-06 ENCOUNTER — LAB ENCOUNTER (OUTPATIENT)
Dept: LAB | Age: 38
End: 2023-09-06
Attending: NURSE PRACTITIONER
Payer: COMMERCIAL

## 2023-09-06 DIAGNOSIS — D64.9 LOW HEMOGLOBIN AND LOW HEMATOCRIT: ICD-10-CM

## 2023-09-06 DIAGNOSIS — D64.9 ANEMIA, UNSPECIFIED TYPE: ICD-10-CM

## 2023-09-06 DIAGNOSIS — Z13.0 SCREENING FOR IRON DEFICIENCY ANEMIA: ICD-10-CM

## 2023-09-06 LAB
BASOPHILS # BLD AUTO: 0.05 X10(3) UL (ref 0–0.2)
BASOPHILS NFR BLD AUTO: 0.6 %
DEPRECATED HBV CORE AB SER IA-ACNC: 2.7 NG/ML
EOSINOPHIL # BLD AUTO: 0.17 X10(3) UL (ref 0–0.7)
EOSINOPHIL NFR BLD AUTO: 2.1 %
ERYTHROCYTE [DISTWIDTH] IN BLOOD BY AUTOMATED COUNT: 16.5 %
HCT VFR BLD AUTO: 36.5 %
HGB BLD-MCNC: 11.2 G/DL
IMM GRANULOCYTES # BLD AUTO: 0.02 X10(3) UL (ref 0–1)
IMM GRANULOCYTES NFR BLD: 0.2 %
IRON SATN MFR SERPL: 5 %
IRON SERPL-MCNC: 23 UG/DL
LYMPHOCYTES # BLD AUTO: 1.97 X10(3) UL (ref 1–4)
LYMPHOCYTES NFR BLD AUTO: 24.1 %
MCH RBC QN AUTO: 24.1 PG (ref 26–34)
MCHC RBC AUTO-ENTMCNC: 30.7 G/DL (ref 31–37)
MCV RBC AUTO: 78.7 FL
MONOCYTES # BLD AUTO: 0.46 X10(3) UL (ref 0.1–1)
MONOCYTES NFR BLD AUTO: 5.6 %
NEUTROPHILS # BLD AUTO: 5.49 X10 (3) UL (ref 1.5–7.7)
NEUTROPHILS # BLD AUTO: 5.49 X10(3) UL (ref 1.5–7.7)
NEUTROPHILS NFR BLD AUTO: 67.4 %
PLATELET # BLD AUTO: 353 10(3)UL (ref 150–450)
RBC # BLD AUTO: 4.64 X10(6)UL
TIBC SERPL-MCNC: 469 UG/DL (ref 240–450)
TRANSFERRIN SERPL-MCNC: 315 MG/DL (ref 200–360)
WBC # BLD AUTO: 8.2 X10(3) UL (ref 4–11)

## 2023-09-06 PROCEDURE — 85025 COMPLETE CBC W/AUTO DIFF WBC: CPT | Performed by: NURSE PRACTITIONER

## 2023-09-06 PROCEDURE — 83550 IRON BINDING TEST: CPT | Performed by: NURSE PRACTITIONER

## 2023-09-06 PROCEDURE — 82728 ASSAY OF FERRITIN: CPT | Performed by: NURSE PRACTITIONER

## 2023-09-06 PROCEDURE — 83540 ASSAY OF IRON: CPT | Performed by: NURSE PRACTITIONER

## 2023-09-06 NOTE — TELEPHONE ENCOUNTER
This pt paged me last night about the same thing. She didn't answer when I called back. Can we please order the labs and let her know.

## 2023-09-07 ENCOUNTER — LAB ENCOUNTER (OUTPATIENT)
Dept: LAB | Age: 38
End: 2023-09-07
Attending: INTERNAL MEDICINE
Payer: COMMERCIAL

## 2023-09-07 DIAGNOSIS — D50.8 OTHER IRON DEFICIENCY ANEMIA: ICD-10-CM

## 2023-09-07 DIAGNOSIS — K21.9 GASTROESOPHAGEAL REFLUX DISEASE, UNSPECIFIED WHETHER ESOPHAGITIS PRESENT: ICD-10-CM

## 2023-09-07 PROCEDURE — 83013 H PYLORI (C-13) BREATH: CPT | Performed by: NURSE PRACTITIONER

## 2023-09-08 LAB
H PYLORI BREATH TEST: NEGATIVE
TTG IGA SER-ACNC: 0.3 U/ML (ref ?–7)

## 2023-09-18 ENCOUNTER — OFFICE VISIT (OUTPATIENT)
Dept: HEMATOLOGY/ONCOLOGY | Facility: HOSPITAL | Age: 38
End: 2023-09-18
Attending: INTERNAL MEDICINE
Payer: COMMERCIAL

## 2023-09-18 VITALS
TEMPERATURE: 99 F | DIASTOLIC BLOOD PRESSURE: 87 MMHG | SYSTOLIC BLOOD PRESSURE: 156 MMHG | BODY MASS INDEX: 35 KG/M2 | WEIGHT: 197.19 LBS | OXYGEN SATURATION: 99 % | HEART RATE: 134 BPM | RESPIRATION RATE: 18 BRPM

## 2023-09-18 DIAGNOSIS — D50.0 IRON DEFICIENCY ANEMIA DUE TO CHRONIC BLOOD LOSS: Primary | ICD-10-CM

## 2023-09-18 PROCEDURE — 99205 OFFICE O/P NEW HI 60 MIN: CPT | Performed by: INTERNAL MEDICINE

## 2023-09-19 ENCOUNTER — TELEPHONE (OUTPATIENT)
Dept: HEMATOLOGY/ONCOLOGY | Facility: HOSPITAL | Age: 38
End: 2023-09-19

## 2023-10-05 ENCOUNTER — APPOINTMENT (OUTPATIENT)
Dept: HEMATOLOGY/ONCOLOGY | Facility: HOSPITAL | Age: 38
End: 2023-10-05
Attending: INTERNAL MEDICINE
Payer: COMMERCIAL

## 2023-10-20 ENCOUNTER — OFFICE VISIT (OUTPATIENT)
Dept: HEMATOLOGY/ONCOLOGY | Facility: HOSPITAL | Age: 38
End: 2023-10-20
Attending: INTERNAL MEDICINE
Payer: COMMERCIAL

## 2023-10-20 VITALS
SYSTOLIC BLOOD PRESSURE: 134 MMHG | TEMPERATURE: 98 F | DIASTOLIC BLOOD PRESSURE: 84 MMHG | HEART RATE: 99 BPM | RESPIRATION RATE: 18 BRPM | OXYGEN SATURATION: 97 %

## 2023-10-20 DIAGNOSIS — D50.0 IRON DEFICIENCY ANEMIA DUE TO CHRONIC BLOOD LOSS: Primary | ICD-10-CM

## 2023-10-20 PROCEDURE — 96365 THER/PROPH/DIAG IV INF INIT: CPT

## 2023-10-20 PROCEDURE — 96376 TX/PRO/DX INJ SAME DRUG ADON: CPT

## 2023-10-20 NOTE — PROGRESS NOTES
Pt here for Infed infusion . Pt denies any issues or concerns. Ordering MD: Regan  Order Exp: one time dose     Pt tolerated infusion without difficulty or complaint. Reviewed next apt date/time: yes      Education Record  Learner:  Patient  Disease / Diagnosis: Plan of care reviewed. Barriers / Limitations:  None  Method:  Brief focused, Discussion, and Reinforcement  General Topics:  Medication and Plan of care reviewed  Outcome:  Shows understanding  . INFED infused without any issues. Patient observed for 30 minutes after INFED completed. Vital signs taken and recorded. Instructed to call for issues/questions/concerns. Call 911 for emergencies. Patient verbalized understanding. Discharged in stable condition.

## 2023-10-30 ENCOUNTER — OFFICE VISIT (OUTPATIENT)
Dept: INTERNAL MEDICINE CLINIC | Facility: CLINIC | Age: 38
End: 2023-10-30

## 2023-10-30 VITALS
WEIGHT: 194 LBS | HEIGHT: 62.5 IN | RESPIRATION RATE: 16 BRPM | BODY MASS INDEX: 34.81 KG/M2 | DIASTOLIC BLOOD PRESSURE: 78 MMHG | SYSTOLIC BLOOD PRESSURE: 120 MMHG | HEART RATE: 92 BPM

## 2023-10-30 DIAGNOSIS — Z51.81 THERAPEUTIC DRUG MONITORING: Primary | ICD-10-CM

## 2023-10-30 DIAGNOSIS — E55.9 VITAMIN D DEFICIENCY: ICD-10-CM

## 2023-10-30 DIAGNOSIS — R73.03 PREDIABETES: ICD-10-CM

## 2023-10-30 DIAGNOSIS — F43.9 STRESS: ICD-10-CM

## 2023-10-30 DIAGNOSIS — E66.9 OBESITY (BMI 30-39.9): ICD-10-CM

## 2023-10-30 DIAGNOSIS — E78.5 HYPERLIPIDEMIA, UNSPECIFIED HYPERLIPIDEMIA TYPE: ICD-10-CM

## 2023-10-30 PROCEDURE — 99213 OFFICE O/P EST LOW 20 MIN: CPT | Performed by: NURSE PRACTITIONER

## 2023-10-30 PROCEDURE — 3008F BODY MASS INDEX DOCD: CPT | Performed by: NURSE PRACTITIONER

## 2023-10-30 PROCEDURE — 3078F DIAST BP <80 MM HG: CPT | Performed by: NURSE PRACTITIONER

## 2023-10-30 PROCEDURE — 3074F SYST BP LT 130 MM HG: CPT | Performed by: NURSE PRACTITIONER

## 2023-10-30 RX ORDER — METFORMIN HYDROCHLORIDE 500 MG/1
TABLET, EXTENDED RELEASE ORAL
COMMUNITY
Start: 2023-10-16

## 2023-10-30 RX ORDER — PHENTERMINE HYDROCHLORIDE 37.5 MG/1
TABLET ORAL
COMMUNITY
Start: 2023-10-19

## 2023-10-30 NOTE — PATIENT INSTRUCTIONS
Next steps:  1. Fill your prescribed medication and take as discussed and prescribed: phentermine and metformin   2. Schedule a personal nutrition consultation with one of our registered dieticians     Please try to work on the following dietary changes:  Daily protein recommendation to start:  grams  Daily carbohydrate: <110g  Daily calories: 1,300-1,400  1. Drink water with meals and throughout the day, cut down on soda and/or juice if consumed. Consider flavored water options like Bubbly, Spindrift, Hint and Shay. 2.  Eat breakfast daily and focus on having protein with each meal, examples include: greek yogurt, cottage cheese, hard boiled egg, whole grain toast with peanut butter. 3.  Reduce refined carbohydrates and sugars which includes items such as sweets, as well as rice, pasta, and bread and make sure to choose whole grain options when having them with just 1 serving per meal about the size of your inner palm. 4.  Consume non starchy veggies daily working towards making them a good 50% of your daily food intake. Add them to lunch and dinner consistently. 5.  Start a daily probiotic: VSL#3 is recommended, (order on line at www.vsl3. com). Take 1 capsule daily with water for 30 days, then reduce to 1 every other day (this will reduce the cost). Capsules can be left out for 2 weeks, but then must be refrigerated. Please download karla My Fitness Orvel Dubin! Or Net Diary to monitor daily dietary intake and you will be able to see if you are eating the right amount of calories or too much or too little which would hinder weight loss. Additionally this will help to see your daily carbohydrate and protein intake. When you set the karla up choose 1-2 lbs/week as a goal.  Keeping a paper food journal is an option as well to remain accountable for your choices- this is the start to mindful eating! A low calorie diet has been consistently shown to support weight loss.      Continue or start exercising to help establish a routine. If not already exercising begin with 1 day and progress as able with long-term goal of 30 minutes 5 days a week at a minimum. Meditation daily can help manage and control stress. Chronic stress can make weight loss difficult. Exercising is one way to help with stress, but meditation using the CALM Araceli or another comparable alternative can be done in your home or place of work with little time commitment. This Araceli can also help work on behavior change and improve sleep. Check out the segment under Calm Masterclass and listen to The 4 Pillars of Health. A great way to begin learning about the foundation of lifestyle with practical tips to use in your every day. Check out www.yourweightmatters. org blog for continued daily support and education along this weight loss journey! Patient Resources:     Personal Training/Fitness Classes/Health Coaching     Scott Saleh and Tenzin Marco Abilly @ [email or web address] Full fitness center with group fitness and personal training. Discount available as client of [facility]. Health Coaching and Personal Training with Camille Hines at our [facility]- individual weekly coaching with option to add personal training and small group fitness classes targeted at weight loss- [phone] and/or email @ [email or web address]  360FIT [place] [email or web address] Group Fitness [phone] and/or email Karen Kelley at [email or web address]  [address] [facility] with multiple locations: Aetna (www.iPrism Global), Eat The Frog Fitness (www.One on One Marketing. Vidyo), Fit Body Bootcamp (www.Omnigybodybootcamp.Vidyo), Kingfish Labs (www.STAR FESTIVAL. Vidyo), The Exercise  (www.exercisecoach.Vidyo)     Online Fitness  Fitness  on Whole [employer] in 10 DVD series- www. cosvc90QGB. Vidyo  Sit and Be Fit - Chair exercise series Www.sitandbefit. org  Hip Hop Fit with Pedro Ravi at www.hiphopfit. net     Apps for on the [employer] 7 Minute Workout (orange box with white 7) - free on the go HIIT training araceli  Peloton Araceli @ wwwNulu     Nutrition Trackers and Tools  LoseIT! And My Fitness Pal apps and on line for tracking nutrition  NOOM - virtual health coaching  FitFoundation (healthy meals on the go) in [facility] @ www. hjbfntxfriqnh5j. Tyesha Milan MD @ wwwiChanged.com and Vinicius Turcios (keto and low carb plans recommended) @ www. UKERYB09.OAR, Metabolic Meals @ www. Loaded PocketabolicMeals. com - individual prepared meals to go  SunGard, OrderGroove, International Business Machines, Every Plate, Fish Nature- on line meal delivery programs for preparation at home  [place] [facility] in [place] for homemade meals to go @ wwwReplenish. Sequel Industrial Products  Diet Doctor @ www. dietdoctor. com - low carb swaps  Yummly - meal prep and planning araceli (www.yummly. com)     Stress Management/Behavior/Mindful Eating  CALM meditation araceli (www.RedPoint Global)  Headspace  Am I Hungry? Mindful eating virtual  araceli  Www.yourweightmatters. org - Obesity Action Coalition sponsored Blog posts daily  Motivation araceli (black box with white \")- daily supportive messages sent to your phone     Books/Video Education/Podcasts  Mindless Eating by Jamie Walsh  Why We Get Sick by Hany Nj (a book about insulin resistance)  Atomic Habits by Angelina Sprague (a book about taking small steps to promote greater behavior change)   Can't Hurt Me by Sachi Macias (a book exploring the power of discipline in achieving your goals)  The End of Dieting: How to Live for Life by Dr. Tony Allison M.D. or listen to The [address] Contact At Once! Street Episode 61: Understanding \"Nutritarian\" Eating w/Dr. Tony Allison  Your Body in Balance:  The World [employer] Corporation of Food, Hormones, and Health by Dr. Roland Soto  The Menopause Diet Plan by [facility]  The Complete Guide to fasting by Dr. Melanie Fang, [address] [facility] by Guillermina Andrade Jonathan Russell, Ph.D, R.D. Weight Loss Surgery Will Not Treat Food Addiction by Noemi Rutledge Ph.D  The [address] [place] [address] on plant based nutrition  Fed Up - documentary about obesity (Free on [place])  The Truth About Sugar - documentary on sugar (Free on ScreenScape Networks, https://youtu. be/9P5xatoQQ5t)  The Dr. Yas Givens by Dr. Astrid Lion MD  Fitlosophy Fitspiration - journal to better health (found at [employer] in fitness aisle)  What Happened to You?- a look at the impact trauma has on behavior written by Salina Reyes and Dr. Kierra Beth Again by Sathish Campbell - discovering your true self after trauma  Manish Koehler talk on Argos Risk, The Call to Parle Innovationage  Podcasts: The Exam Room by the Physician's Committee, Nutrition Facts by Dr. Abisai Andrade    We are here to support you with weight loss, but please remember that you still need your primary care provider for your routine health maintenance.

## 2023-11-27 RX ORDER — PHENTERMINE HYDROCHLORIDE 37.5 MG/1
37.5 TABLET ORAL
Qty: 30 TABLET | Refills: 2 | Status: SHIPPED | OUTPATIENT
Start: 2023-11-27

## 2023-11-27 NOTE — TELEPHONE ENCOUNTER
Requesting   Requested Prescriptions     Pending Prescriptions Disp Refills    PHENTERMINE HCL 37.5 MG Oral Tab [Pharmacy Med Name: PHENTERMINE 37.5MG TABLETS] 30 tablet 0     Sig: TAKE 1 TABLET(37.5 MG) BY MOUTH EVERY MORNING BEFORE BREAKFAST     LOV: 10/30/23  RTC: 3 months  Filled: 8/16/23 #30 with 2 refills    Future Appointments   Date Time Provider Baltazar Hopkins   1/10/2024  3:40 PM DO SANJANA [facility]on   1/30/2024  4:00 PM MELVA Rob [facility]

## 2024-01-22 RX ORDER — METFORMIN HYDROCHLORIDE 500 MG/1
500 TABLET, EXTENDED RELEASE ORAL 2 TIMES DAILY WITH MEALS
Qty: 60 TABLET | Refills: 2 | Status: SHIPPED | OUTPATIENT
Start: 2024-01-22

## 2024-01-22 NOTE — TELEPHONE ENCOUNTER
Requesting   Requested Prescriptions     Pending Prescriptions Disp Refills    METFORMIN  MG Oral Tablet 24 Hr [Pharmacy Med Name: METFORMIN ER 500MG 24HR TABS] 60 tablet 0     Sig: TAKE 1 TABLET(500 MG) BY MOUTH TWICE DAILY WITH MEALS     LOV: 10/30/23  RTC: 3 months  Filled: 8/16/23 #60 with 2 refills    Future Appointments   Date Time Provider Department Center   5/6/2024 11:20 AM Yoselyn East APRN [facility]     An 10/30 pt was to continue metformin

## 2024-03-06 RX ORDER — METFORMIN HYDROCHLORIDE 500 MG/1
500 TABLET, EXTENDED RELEASE ORAL 2 TIMES DAILY WITH MEALS
Qty: 60 TABLET | Refills: 2 | Status: SHIPPED | OUTPATIENT
Start: 2024-03-06

## 2024-03-06 NOTE — TELEPHONE ENCOUNTER
Requesting   Requested Prescriptions     Pending Prescriptions Disp Refills    METFORMIN  MG Oral Tablet 24 Hr [Pharmacy Med Name: METFORMIN ER 500MG 24HR TABS] 60 tablet 2     Sig: TAKE 1 TABLET(500 MG) BY MOUTH TWICE DAILY WITH MEALS       LOV: 10/30/2023  RTC: 3 months   Last Relevant Labs:   Filled: 1/22/2024 #60 with 1 refills    Future Appointments   Date Time Provider Department Center   3/12/2024  9:20 AM Yoselyn East APRN [facility]

## 2024-03-12 ENCOUNTER — OFFICE VISIT (OUTPATIENT)
Dept: INTERNAL MEDICINE CLINIC | Facility: CLINIC | Age: 39
End: 2024-03-12
Payer: COMMERCIAL

## 2024-03-12 VITALS
RESPIRATION RATE: 16 BRPM | WEIGHT: 196 LBS | HEART RATE: 102 BPM | HEIGHT: 62.5 IN | DIASTOLIC BLOOD PRESSURE: 80 MMHG | SYSTOLIC BLOOD PRESSURE: 110 MMHG | BODY MASS INDEX: 35.16 KG/M2

## 2024-03-12 DIAGNOSIS — R63.2 BINGE EATING: ICD-10-CM

## 2024-03-12 DIAGNOSIS — F43.9 STRESS: ICD-10-CM

## 2024-03-12 DIAGNOSIS — E78.5 HYPERLIPIDEMIA, UNSPECIFIED HYPERLIPIDEMIA TYPE: ICD-10-CM

## 2024-03-12 DIAGNOSIS — Z51.81 THERAPEUTIC DRUG MONITORING: Primary | ICD-10-CM

## 2024-03-12 DIAGNOSIS — E55.9 VITAMIN D DEFICIENCY: ICD-10-CM

## 2024-03-12 DIAGNOSIS — E66.9 OBESITY (BMI 30-39.9): ICD-10-CM

## 2024-03-12 DIAGNOSIS — R73.03 PREDIABETES: ICD-10-CM

## 2024-03-12 PROCEDURE — 3008F BODY MASS INDEX DOCD: CPT | Performed by: NURSE PRACTITIONER

## 2024-03-12 PROCEDURE — 3079F DIAST BP 80-89 MM HG: CPT | Performed by: NURSE PRACTITIONER

## 2024-03-12 PROCEDURE — 99214 OFFICE O/P EST MOD 30 MIN: CPT | Performed by: NURSE PRACTITIONER

## 2024-03-12 PROCEDURE — 3074F SYST BP LT 130 MM HG: CPT | Performed by: NURSE PRACTITIONER

## 2024-03-12 RX ORDER — TIRZEPATIDE 2.5 MG/.5ML
2.5 INJECTION, SOLUTION SUBCUTANEOUS WEEKLY
Qty: 2 ML | Refills: 0 | Status: SHIPPED | OUTPATIENT
Start: 2024-03-12

## 2024-03-12 NOTE — PATIENT INSTRUCTIONS
Next steps:  1.  Fill your prescribed medication and take as discussed and prescribed: phentermine 37.5mg, metformin 500mg bid   Zepbound 2.5mg weekly x 4 weeks   2.  Schedule a personal nutrition consultation with one of our registered dieticians     Please try to work on the following dietary changes:  Daily protein recommendation to start:  grams  Daily carbohydrate: <115g  Daily calories: 1,300-1,400  1.  Drink water with meals and throughout the day, cut down on soda and/or juice if consumed. Consider flavored water options like Bubbly, Spindrift, Hint and Shay.  2.  Eat breakfast daily and focus on having protein with each meal, examples include: greek yogurt, cottage cheese, hard boiled egg, whole grain toast with peanut butter.   3.  Reduce refined carbohydrates and sugars which includes items such as sweets, as well as rice, pasta, and bread and make sure to choose whole grain options when having them with just 1 serving per meal about the size of your inner palm.  4.  Consume non starchy veggies daily working towards making them a good 50% of your daily food intake. Add them to lunch and dinner consistently.  5.  Start a daily probiotic: VSL#3 is recommended, (order on line at www.vsl3.com). Take 1 capsule daily with water for 30 days, then reduce to 1 every other day (this will reduce the cost). Capsules can be left out for 2 weeks, but then must be refrigerated.      Please download karla My Fitness Pal, LoseIt! Or Net Diary to monitor daily dietary intake and you will be able to see if you are eating the right amount of calories or too much or too little which would hinder weight loss. Additionally this will help to see your daily carbohydrate and protein intake. When you set the karla up choose 1-2 lbs/week as a goal.  Keeping a paper food journal is an option as well to remain accountable for your choices- this is the start to mindful eating! A low calorie diet has been consistently shown to  support weight loss.     Continue or start exercising to help establish a routine. If not already exercising begin with 1 day and progress as able with long-term goal of 30 minutes 5 days a week at a minimum.     Meditation daily can help manage and control stress. Chronic stress can make weight loss difficult.  Exercising is one way to help with stress, but meditation using the CALM Araceli or another comparable alternative can be done in your home or place of work with little time commitment. This Araceli can also help work on behavior change and improve sleep. Check out the segment under Calm Masterclass and listen to The 4 Pillars of Health. A great way to begin learning about the foundation of lifestyle with practical tips to use in your every day.     Check out www.yourweightmatters.org blog for continued daily support and education along this weight loss journey!    Patient Resources:     Personal Training/Fitness Classes/Health Coaching     [facility] @ [email or web address]/healthy-[email or web address]/[email or web address]-center Full fitness center with group fitness and personal training. Discount available as client of [facility].  Health Coaching and Personal Training with Mari Madrigal at our [facility]- individual weekly coaching with option to add personal training and small group fitness classes targeted at weight loss- [phone] and/or email @ [email or web address]  360FIT [place] http://www.Energy Storage Systems. Group Fitness [phone] and/or email Ciarra at [email or web address]  [facility] Fitness Centers with multiple locations: Backupify (www.Topic), Eat The Frog Fitness (www.Hightower.Covercake), Fit Body Bootcamp (www.BIND Therapeuticsbodybootcamp.com), Actively Learn Fitness (www.Brand Embassy.Covercake), The Exercise  (www.exercisecoach.Covercake)     Online Fitness  Fitness  on Affinity Circles  Fit in 10 DVD series- www.vruez77NJX.Covercake  Sit  and Be Fit - Chair exercise series Www.sitandbefit.org  Hip Hop Fit with Pedro Ravi at www.hiphopfit.net     Apps for on the Go Fitness  [place] 7 Minute Workout (orange box with white 7) - free on the go HIIT training araceli  Peloton Araceli @ www.onepeloton.com     Nutrition Trackers and Tools  LoseIT! And My Fitness Pal apps and on line for tracking nutrition  NOOM - virtual health coaching  FitFoundation (healthy meals on the go) in [place] @ www.vupgnlheaxocl6dNumerify  Phil MONROY @ wwwU.S. Fiduciarybistromd.com and Bommky05 (keto and low carb plans recommended) @ www.plbgli17.com, Metabolic Meals @ www.Neutral SpaceMetabolicMeals.Ondeego - individual prepared meals to go  Gobble, Blue Apron, Home , Every Plate, Sunbasket- on line meal delivery programs for preparation at home  Meal [place] in [place] for homemade meals to go @ www.mealGlobal Photonic Energyage.Ondeego  Diet Doctor @ www.dietdoctor.Ondeego - low carb swaps  Yummly - meal prep and planning araceli (www.yummly.com)     Stress Management/Behavior/Mindful Eating  CALM meditation araceli (www.calm.com)  Headspace  Am I Hungry? Mindful eating virtual  araceli  Www.yourweightmatters.org - Obesity Action Coalition sponsored Blog posts daily  Motivation araceli (black box with white \")- daily supportive messages sent to your phone     Books/Video Education/Podcasts  Mindless Eating by Red Rome  Why We Get Sick by Luca Oliveira (a book about insulin resistance)  Atomic Habits by Samy Saucedo (a book about taking small steps to promote greater behavior change)   Can't Hurt Me by Fer Andrea (a book exploring the power of discipline in achieving your goals)  The End of Dieting: How to Live for Life by Dr. Alfa Pérez M.D. or listen to The MyMedLeads.com Podcast Episode 63: Understanding \"Nutritarian\" Eating w/Dr. Alfa Pérez  Your Body in Balance: The New Science of Food, Hormones, and Health by Dr. Lc Khan  The Menopause Diet Plan by Iris Zheng and Dalia David  The Complete Guide to fasting by   Kellie  Sugar, Salt & Fat by Agatha Ahn, Ph.D, R.D.  Weight Loss Surgery Will Not Treat Food Addiction by Adelia Padilla Ph.D  The Game Changers- FunnelFireix Documentary on plant based nutrition  Fed Up - documentary about obesity (Free on Utube)  The Truth About Sugar - documentary on sugar (Free on Utube, https://youtu.be/2F6cerdQI9o)  The Dr. Eng T5 Wellness Plan by Dr. Robert Eng MD  Fitlosophy Fitspiration - journal to better health (found at [employer] in fitness aisle)  What Happened to You?- a look at the impact trauma has on behavior written by Edwin Gardner and Dr. Macario Reilly  Whole Again by Marty Saini - discovering your true self after trauma  Kris Muñoz talk on [employer] [facility], The Call to Courage  Podcasts: The Exam Room by the Physician's Committee, Nutrition Facts by Dr. Olivarez    We are here to support you with weight loss, but please remember that you still need your primary care provider for your routine health maintenance.

## 2024-03-12 NOTE — PROGRESS NOTES
HISTORY OF PRESENT ILLNESS  Chief Complaint   Patient presents with    Weight Check     +2     Lily Oconnell is a 38 year old female here for follow up with medical weight loss program for the treatment of overweight, obesity, or morbid obesity.     Up #2 lbs  Compliant on phentermine 37.5mg and metformin 500mg   Tolerating well, helping with decreasing appetite and no side effects     Had iron infusion in oct 2023 (couldn't exercise since elevated heart rate for about 1 month after that  Success: turning down eating out often  Challenging: getting to the gym at night   Exercise/Activity: 7x/ week, via walking, not doing anything routine as far as exercise  Nutrition: eating regular meals, +protein, minimal veggies. not tracking reports  Meals out per week on average: 1  Stress is manageable   Sleep: 5 hours/night, waking up feeling tired (too busy)     Denies chest pain, shortness of breath, dizziness, blurred vision, headache, paresthesia, nausea/vomiting.     Breakfast Lunch Dinner Snacks Fluids   Reviewed              Wt Readings from Last 6 Encounters:   03/12/24 196 lb (88.9 kg)   10/30/23 194 lb (88 kg)   09/18/23 197 lb 3.2 oz (89.4 kg)   09/05/23 194 lb (88 kg)   09/01/23 193 lb (87.5 kg)   08/16/23 193 lb (87.5 kg)          REVIEW OF SYSTEMS  GENERAL: feels well otherwise, denied any fevers chills or night sweats   LUNGS: denies shortness of breath  CARDIOVASCULAR: denies chest pain  GI: denies abdominal pain  MUSCULOSKELETAL: denies back pain, joint pains   PSYCH: denies change in behavior or mood, denies feeling sad or depressed    EXAM  /80   Pulse 102   Resp 16   Ht 5' 2.5\" (1.588 m)   Wt 196 lb (88.9 kg)   LMP 02/23/2024 (Approximate)   BMI 35.28 kg/m²       GENERAL: well developed, well nourished, in no apparent distress, A/O x3  SKIN: no rashes, no suspicious lesions  HEENT: atraumatic, normocephalic, OP-clear, PERRLA  NECK: supple, no adenopathy  LUNGS: CTA in all fields, breathing  non labored  CARDIO: RRR without murmur  GI: +BS, NT/ND, no masses or HSM  EXTREMITIES: no cyanosis, no clubbing, no edema    Lab Results   Component Value Date    GLU 85 08/28/2023    BUN 10 08/28/2023    BUNCREA 14.7 07/24/2021    CREATSERUM 0.95 08/28/2023    ANIONGAP 3 08/28/2023    GFRNAA 77 03/15/2022    GFRAA 88 03/15/2022    CA 8.6 08/28/2023    OSMOCALC 280 08/28/2023    ALKPHO 82 08/28/2023    AST 15 08/28/2023    ALT 25 08/28/2023    BILT 0.3 08/28/2023    TP 7.0 08/28/2023    ALB 3.4 08/28/2023    GLOBULIN 3.6 08/28/2023     08/28/2023    K 3.6 08/28/2023     08/28/2023    CO2 26.0 08/28/2023     Lab Results   Component Value Date     08/28/2023    A1C 5.7 (H) 08/28/2023     Lab Results   Component Value Date    CHOLEST 205 (H) 08/28/2023    TRIG 147 08/28/2023    HDL 54 08/28/2023     (H) 08/28/2023    VLDL 26 08/28/2023    NONHDLC 151 (H) 08/28/2023     Lab Results   Component Value Date    B12 417 08/28/2023     Lab Results   Component Value Date    VITD 30.6 08/28/2023       Current Outpatient Medications on File Prior to Visit   Medication Sig Dispense Refill    METFORMIN  MG Oral Tablet 24 Hr TAKE 1 TABLET(500 MG) BY MOUTH TWICE DAILY WITH MEALS 60 tablet 2    Phentermine HCl 37.5 MG Oral Tab TAKE 1 TABLET(37.5 MG) BY MOUTH EVERY MORNING BEFORE BREAKFAST 30 tablet 2     No current facility-administered medications on file prior to visit.       ASSESSMENT/PLAN    ICD-10-CM    1. Therapeutic drug monitoring  Z51.81 Tirzepatide-Weight Management (ZEPBOUND) 2.5 MG/0.5ML Subcutaneous Solution Auto-injector      2. Obesity (BMI 30-39.9)  E66.9 Tirzepatide-Weight Management (ZEPBOUND) 2.5 MG/0.5ML Subcutaneous Solution Auto-injector      3. Prediabetes  R73.03       4. Hyperlipidemia, unspecified hyperlipidemia type  E78.5       5. Vitamin D deficiency  E55.9       6. Stress  F43.9       7. Binge eating  R63.2           PLAN   Initial Weight Data and Goal Weight  Loss:  Initial consult: #215 lbs on 3/2022  Weight Calculations  Initial Weight: 215 lbs  Initial Weight Date: 03/01/22  Today's Weight: 196 lbs  5% Goal: 10.75  10% Goal: 21.5  Total Weight Loss: 19 lbs  Total weight loss: up #2 lbs total, Net loss 19 lbs  Continue with medications: phentermine 37.5mg  Continue with medications: metformin 500mg bid  Will trial zepbound 2.5mg weekly x4 weeks and then (send in rumr message in 3 weeks) to say either you want to stay at the same dose or increase to 5mg. Denies any personal or family history of pancreatitis, pancreatic cancer, thyroid cancer, MEN2  (if not covered by insurance can try compound)   --advised of side effects and adverse effects of this medication  Contradictions:  has done metformin in the past, vyvanse    Reviewed labs  Will continue with vitamin d OTC  Binge eating, stable  HLD  stable, follows with PCP   Hx of prediabetes, reviewed last a1c 5.7% on 8/2023  Stress, discussed ways to help reduce this again- see HPI  Sleep hygiene discussed and ways to help with this  Wrote out macros and encouraged to track food   Nutrition: Low carb diet, recommended to eat breakfast daily/ regular protein intake  Follow up with dietitian and psychologist as recommended.  Discussed the role of sleep and stress in weight management.  Counseled on comprehensive weight loss plan including attention to nutrition, exercise and behavior/stress management for success. See patient instruction below for more details.  Discussed strategies to overcome barriers to successful weight loss and weight maintenance  FITTE: ACSM recommendations (150-300 minutes/ week in active weight loss)   Weight Loss Consent to treat reviewed and signed.    Total time spent on chart review, pre-charting, obtaining history, counseling, and educating, reviewing labs was 30 minutes.       NOTE TO PATIENT: The 21st Century Cures Act makes clinical notes like these available to patients in the interest  of transparency. Clinical notes are medical documents used by physicians and care providers to communicate with each other. These documents include medical language and terminology, abbreviations, and treatment information that may sound technical and at times possibly unfamiliar. In addition, at times, the verbiage may appear blunt or direct. These documents are one tool providers use to communicate relevant information and clinical opinions of the care providers in a way that allows common understanding of the clinical context.     There are no Patient Instructions on file for this visit.    No follow-ups on file.    Patient verbalizes understanding.    Yoselyn East, APRN

## 2024-03-14 ENCOUNTER — LAB ENCOUNTER (OUTPATIENT)
Dept: LAB | Age: 39
End: 2024-03-14
Attending: INTERNAL MEDICINE
Payer: COMMERCIAL

## 2024-03-14 DIAGNOSIS — D50.0 IRON DEFICIENCY ANEMIA DUE TO CHRONIC BLOOD LOSS: ICD-10-CM

## 2024-03-14 LAB
BASOPHILS # BLD AUTO: 0.06 X10(3) UL (ref 0–0.2)
BASOPHILS NFR BLD AUTO: 0.6 %
DEPRECATED HBV CORE AB SER IA-ACNC: 18.9 NG/ML
EOSINOPHIL # BLD AUTO: 0.18 X10(3) UL (ref 0–0.7)
EOSINOPHIL NFR BLD AUTO: 1.9 %
ERYTHROCYTE [DISTWIDTH] IN BLOOD BY AUTOMATED COUNT: 12.7 %
HCT VFR BLD AUTO: 42.4 %
HGB BLD-MCNC: 14.6 G/DL
IMM GRANULOCYTES # BLD AUTO: 0.02 X10(3) UL (ref 0–1)
IMM GRANULOCYTES NFR BLD: 0.2 %
IRON SATN MFR SERPL: 23 %
IRON SERPL-MCNC: 84 UG/DL
LYMPHOCYTES # BLD AUTO: 2.32 X10(3) UL (ref 1–4)
LYMPHOCYTES NFR BLD AUTO: 24.5 %
MCH RBC QN AUTO: 30.7 PG (ref 26–34)
MCHC RBC AUTO-ENTMCNC: 34.4 G/DL (ref 31–37)
MCV RBC AUTO: 89.3 FL
MONOCYTES # BLD AUTO: 0.65 X10(3) UL (ref 0.1–1)
MONOCYTES NFR BLD AUTO: 6.9 %
NEUTROPHILS # BLD AUTO: 6.23 X10 (3) UL (ref 1.5–7.7)
NEUTROPHILS # BLD AUTO: 6.23 X10(3) UL (ref 1.5–7.7)
NEUTROPHILS NFR BLD AUTO: 65.9 %
PLATELET # BLD AUTO: 320 10(3)UL (ref 150–450)
RBC # BLD AUTO: 4.75 X10(6)UL
TIBC SERPL-MCNC: 373 UG/DL (ref 240–450)
TRANSFERRIN SERPL-MCNC: 250 MG/DL (ref 200–360)
WBC # BLD AUTO: 9.5 X10(3) UL (ref 4–11)

## 2024-03-14 PROCEDURE — 83550 IRON BINDING TEST: CPT

## 2024-03-14 PROCEDURE — 85025 COMPLETE CBC W/AUTO DIFF WBC: CPT

## 2024-03-14 PROCEDURE — 36415 COLL VENOUS BLD VENIPUNCTURE: CPT

## 2024-03-14 PROCEDURE — 82728 ASSAY OF FERRITIN: CPT

## 2024-03-14 PROCEDURE — 83540 ASSAY OF IRON: CPT

## 2024-03-21 ENCOUNTER — OFFICE VISIT (OUTPATIENT)
Dept: HEMATOLOGY/ONCOLOGY | Facility: HOSPITAL | Age: 39
End: 2024-03-21
Attending: INTERNAL MEDICINE
Payer: COMMERCIAL

## 2024-03-21 VITALS — SYSTOLIC BLOOD PRESSURE: 123 MMHG | DIASTOLIC BLOOD PRESSURE: 84 MMHG

## 2024-03-21 VITALS
WEIGHT: 122.19 LBS | SYSTOLIC BLOOD PRESSURE: 144 MMHG | RESPIRATION RATE: 18 BRPM | HEART RATE: 110 BPM | DIASTOLIC BLOOD PRESSURE: 96 MMHG | TEMPERATURE: 98 F | OXYGEN SATURATION: 96 % | HEIGHT: 62.52 IN | BODY MASS INDEX: 21.92 KG/M2

## 2024-03-21 DIAGNOSIS — D50.0 IRON DEFICIENCY ANEMIA DUE TO CHRONIC BLOOD LOSS: Primary | ICD-10-CM

## 2024-03-21 PROCEDURE — 99215 OFFICE O/P EST HI 40 MIN: CPT | Performed by: INTERNAL MEDICINE

## 2024-03-21 PROCEDURE — 96365 THER/PROPH/DIAG IV INF INIT: CPT

## 2024-03-21 NOTE — PROGRESS NOTES
Hematology/Oncology Clinic Follow Up Visit    Patient Name: Lily Oconnell  Medical Record Number: [id number]    YOB: 1985   PCP: Margareth Grande MD    Reason for Consultation:  Lily Oconnell was seen today for the diagnosis of iron deficiency    History of Present Illness:      39 y/o F PMH GERD, iron deficiency anemia who presents for follow up.    - repeat hemoglobin normalized at 14.6, iron studies showed iron sat of 23%, ferritin of 18.9  - here to get repeat IV infed  - she said she felt the same after she got her first dose IV infed, no real improvement  - reports her periods actually got heavier after 1st dose IV infed  - working with PCP for weight loss    Past Medical History:  Past Medical History:   Diagnosis Date    Anemia 23    Change in hair A year ago    I feel my hair is falling out. It comes out in handfuls.    Chest pain     Decorative tattoo 2008 and 2011    Easy bruising     Esophageal reflux 2013    Heart palpitations Recently    Heartburn     Also during pregnancy but never before or outside of pregnancy before     High cholesterol     Hoarseness, chronic     Hyperlipidemia 2007    Indigestion     Nausea     Obesity 2016    Problems with swallowing     I feel rhwre is something “stuck in my throat” often    Sleep disturbance     Stress     Uncomfortable fullness after meals     Wears glasses Distance dec 2020     Past Surgical History:   Procedure Laterality Date           and     OTHER SURGICAL HISTORY  2009    [facility] teeth       Home Medications:  No outpatient medications have been marked as taking for the 3/21/24 encounter (Appointment) with Danilo Spears MD.       Allergies:   Allergies   Allergen Reactions    Penicillin G HIVES    Morphine ITCHING and RASH       Psychosocial History:  Social History     Socioeconomic History    Marital status:      Spouse name: Not on file    Number of children: Not on file     Years of education: Not on file    Highest education level: Not on file   Occupational History    Not on file   Tobacco Use    Smoking status: Never    Smokeless tobacco: Never   Vaping Use    Vaping Use: Never used   Substance and Sexual Activity    Alcohol use: Yes     Comment: approx 1 drink every year or two    Drug use: Never    Sexual activity: Not on file   Other Topics Concern    Caffeine Concern Yes     Comment: 1 caffenated drink daily     Exercise Yes     Comment: walking/jogging daily .. 12,000 steps    Seat Belt Yes    Special Diet No    Stress Concern No    Weight Concern Yes   Social History Narrative    Not on file     Social Determinants of Health     Financial Resource Strain: Not on file   Food Insecurity: Not on file   Transportation Needs: Not on file   Physical Activity: Not on file   Stress: Not on file   Social Connections: Not on file   Housing Stability: Not on file       Family Medical History:  Family History   Problem Relation Age of Onset    Diabetes Father     Stroke Father     Thyroid disease Father     Hypertension Father     Lipids Father     Obesity Father     Heart Attack Father     Dementia Father     Diabetes Mother     Heart Attack Mother     Thyroid disease Mother     Hypertension Mother     Lipids Mother     Obesity Mother     Depression Mother     Cancer Mother     No Known Problems Maternal Grandmother     Diabetes Maternal Grandfather     Other (Other) Maternal Grandfather         Parkinson's Disease    Other (COPD) Paternal Grandmother     Other (Alzheimer's Disease) Paternal Grandfather     Other (sepsis) Sister     No Known Problems Daughter        Review of Systems:  A 10-point ROS was done with pertinent positives and negative per the HPI    Vital Signs:  Height: --  Weight: --  BSA (Calculated - sq m): --  Pulse: --  BP: --  Temp: --  Do Not Use - Resp Rate: --  SpO2: --    Wt Readings from Last 6 Encounters:   03/12/24 88.9 kg (196 lb)   10/30/23 88 kg (194 lb)    09/18/23 89.4 kg (197 lb 3.2 oz)   09/05/23 88 kg (194 lb)   09/01/23 87.5 kg (193 lb)   08/16/23 87.5 kg (193 lb)         Physical Examination:  General: Patient is alert and oriented, not in acute distress  Psych: Mood and affect are appropriate  Eyes: EOMI, PERRL  ENT: Oropharynx is clear, no adenopathy  CV: no LE edema  Respiratory: Non-labored respirations  Neurological: Grossly intact   Skin: no rashes or petechiae    Laboratory:  Lab Results   Component Value Date    WBC 9.5 03/14/2024    WBC 8.2 09/06/2023    WBC 8.9 08/28/2023    HGB 14.6 03/14/2024    HGB 11.2 (L) 09/06/2023    HGB 10.9 (L) 08/28/2023    HCT 42.4 03/14/2024    MCV 89.3 03/14/2024    MCH 30.7 03/14/2024    MCHC 34.4 03/14/2024    RDW 12.7 03/14/2024    .0 03/14/2024    .0 09/06/2023    .0 08/28/2023     Lab Results   Component Value Date    GLU 85 08/28/2023    BUN 10 08/28/2023    BUNCREA 14.7 07/24/2021    CREATSERUM 0.95 08/28/2023    CREATSERUM 0.95 03/15/2022    CREATSERUM 1.02 07/24/2021    ANIONGAP 3 08/28/2023    GFRNAA 77 03/15/2022    GFRAA 88 03/15/2022    CA 8.6 08/28/2023    OSMOCALC 280 08/28/2023    ALKPHO 82 08/28/2023    AST 15 08/28/2023    ALT 25 08/28/2023    BILT 0.3 08/28/2023    TP 7.0 08/28/2023    ALB 3.4 08/28/2023    GLOBULIN 3.6 08/28/2023     08/28/2023    K 3.6 08/28/2023     08/28/2023    CO2 26.0 08/28/2023     No results found for: \"PTT\", \"PT\", \"INR\"    Impression & Plan:     Iron deficiency anemia  - iron studies consistent with iron deficiency. Her anemia and microcytosis have corrected with her initial dose of IV infed however  - given persistent iron deficiency, repeat 1000mg IV iron dextran today, close monitoring in infusion. She tolerated her first dose without issues  - repeat CBC/iron/tibc/ferritin in 6 months to reassess iron stores    Danilo Spears  Hematology/Medical Oncology  [facility]

## 2024-03-21 NOTE — PROGRESS NOTES
Education Record    Learner:  Patient    Disease / Diagnosis: Iron deficiency     Barriers / Limitations:  None   Comments:    Method:  Discussion   Comments:    General Topics:  Plan of care reviewed   Comments:    Outcome:  Shows understanding   Comments:    Here for infed infusion. No SHAWN symptoms.

## 2024-03-21 NOTE — PROGRESS NOTES
Pt here for Infed . Pt denies any issues or concerns.      Ordering MD: dr putnam  Order Exp: ongoing     Pt tolerated infusion without difficulty or complaint. Reviewed next apt date/time: yes      Education Record  Learner:  Patient  Disease / Diagnosis: SHAWN  Barriers / Limitations:  None  Method:  Brief focused  General Topics:  Plan of care reviewed  Outcome:  Shows understanding

## 2024-04-05 DIAGNOSIS — E66.9 OBESITY (BMI 30-39.9): Primary | ICD-10-CM

## 2024-04-05 RX ORDER — PHENTERMINE HYDROCHLORIDE 37.5 MG/1
37.5 TABLET ORAL
Qty: 30 TABLET | Refills: 2 | Status: SHIPPED | OUTPATIENT
Start: 2024-04-05

## 2024-04-05 NOTE — TELEPHONE ENCOUNTER
Requesting   Requested Prescriptions     Pending Prescriptions Disp Refills    PHENTERMINE HCL 37.5 MG Oral Tab [Pharmacy Med Name: PHENTERMINE 37.5MG TABLETS] 30 tablet 0     Sig: TAKE 1 TABLET(37.5 MG) BY MOUTH EVERY MORNING BEFORE BREAKFAST       LOV: 03/12/2024  RTC: in about 3 months  Filled: 11/27/2023 #30 with 2 refills    Future Appointments   Date Time Provider Department Center   6/7/2024  9:00 AM Yoselyn East APRN [facility] [address]   9/19/2024  1:45 PM Daniol Spears MD  [facility]   9/19/2024  2:00 PM  TX RN4  [facility]

## 2024-04-24 ENCOUNTER — OFFICE VISIT (OUTPATIENT)
Dept: FAMILY MEDICINE CLINIC | Facility: CLINIC | Age: 39
End: 2024-04-24
Payer: COMMERCIAL

## 2024-04-24 VITALS
OXYGEN SATURATION: 98 % | RESPIRATION RATE: 18 BRPM | SYSTOLIC BLOOD PRESSURE: 128 MMHG | HEIGHT: 62 IN | DIASTOLIC BLOOD PRESSURE: 84 MMHG | TEMPERATURE: 98 F | HEART RATE: 115 BPM | WEIGHT: 195 LBS | BODY MASS INDEX: 35.88 KG/M2

## 2024-04-24 DIAGNOSIS — J02.9 SORE THROAT: Primary | ICD-10-CM

## 2024-04-24 LAB
CONTROL LINE PRESENT WITH A CLEAR BACKGROUND (YES/NO): YES YES/NO
KIT LOT #: NORMAL NUMERIC

## 2024-04-24 PROCEDURE — 87081 CULTURE SCREEN ONLY: CPT | Performed by: FAMILY MEDICINE

## 2024-04-24 PROCEDURE — 87147 CULTURE TYPE IMMUNOLOGIC: CPT | Performed by: FAMILY MEDICINE

## 2024-04-24 PROCEDURE — 87880 STREP A ASSAY W/OPTIC: CPT | Performed by: FAMILY MEDICINE

## 2024-04-24 PROCEDURE — 99213 OFFICE O/P EST LOW 20 MIN: CPT | Performed by: FAMILY MEDICINE

## 2024-04-24 PROCEDURE — 3074F SYST BP LT 130 MM HG: CPT | Performed by: FAMILY MEDICINE

## 2024-04-24 PROCEDURE — 3079F DIAST BP 80-89 MM HG: CPT | Performed by: FAMILY MEDICINE

## 2024-04-24 PROCEDURE — 3008F BODY MASS INDEX DOCD: CPT | Performed by: FAMILY MEDICINE

## 2024-04-24 NOTE — PROGRESS NOTES
CHIEF COMPLAINT:     Chief Complaint   Patient presents with    Sore Throat     Sore throat and 100 fever. Symptoms started this morning at 3am   OTC: none   No exposure          HPI:   Lily Oconnell is a 39 year old female presents to clinic with complaint of sore throat that woke pt at 3am with 100 degree fever. Patient denies congestion, cough, headache, nausea, rash. Has no recent history of strep throat. No one is sick at home but daughter's friend had strep-- started 2 weeks ago-- did not respond to amox, was started on new abx 5 days ago and pt was around the child on that day.  Treating symptoms with nothing so far.    Current Outpatient Medications   Medication Sig Dispense Refill    Phentermine HCl 37.5 MG Oral Tab TAKE 1 TABLET(37.5 MG) BY MOUTH EVERY MORNING BEFORE BREAKFAST 30 tablet 2    Tirzepatide-Weight Management (ZEPBOUND) 2.5 MG/0.5ML Subcutaneous Solution Auto-injector Inject 2.5 mg into the skin once a week. (Patient not taking: Reported on 3/21/2024) 2 mL 0    METFORMIN  MG Oral Tablet 24 Hr TAKE 1 TABLET(500 MG) BY MOUTH TWICE DAILY WITH MEALS (Patient not taking: Reported on 3/21/2024) 60 tablet 2     No current facility-administered medications for this visit.      Past Medical History:    Anemia    Change in hair    I feel my hair is falling out. It comes out in handfuls.    Chest pain    Decorative tattoo    Easy bruising    Esophageal reflux    Heart palpitations    Heartburn    Also during pregnancy but never before or outside of pregnancy before 2021    High cholesterol    Hoarseness, chronic    Hyperlipidemia    Indigestion    Nausea    Obesity    Problems with swallowing    I feel rhwre is something “stuck in my throat” often    Sleep disturbance    Stress    Uncomfortable fullness after meals    Wears glasses      Social History:  Social History     Socioeconomic History    Marital status:    Tobacco Use    Smoking status: Never    Smokeless tobacco: Never   Vaping  Use    Vaping status: Never Used   Substance and Sexual Activity    Alcohol use: Yes     Comment: approx 1 drink every year or two    Drug use: Never   Other Topics Concern    Caffeine Concern Yes     Comment: 1 caffenated drink daily     Exercise Yes     Comment: walking/jogging daily .. 12,000 steps    Seat Belt Yes    Special Diet No    Stress Concern No    Weight Concern Yes        REVIEW OF SYSTEMS:   GENERAL HEALTH: feels well otherwise, normal appetite  SKIN: denies any unusual skin lesions or rashes  HEENT: denies ear pain, See HPI  RESPIRATORY: denies shortness of breath or wheezing  CARDIOVASCULAR: denies chest pain or palpitations   GI: denies vomiting or diarrhea  NEURO: denies dizziness or lightheadedness    EXAM:   /84 (BP Location: Left arm)   Pulse 115   Temp 97.5 °F (36.4 °C) (Temporal)   Resp 18   Ht 5' 2\" (1.575 m)   Wt 195 lb (88.5 kg)   LMP 04/20/2024 (Approximate)   SpO2 98%   BMI 35.67 kg/m²   GENERAL: well developed, well nourished,in no apparent distress  SKIN: no rashes,no suspicious lesions  HEAD: atraumatic, normocephalic  EYES: conjunctivae clear, EOM intact  EARS: TM's clear, non-injected, no bulging, retraction, or fluid bilaterally  NOSE: nostrils patent, no exudates, nasal mucosa pink and noninflamed  THROAT: oral mucosa pink, moist. Posterior pharynx mildly erythematous and injected. no exudates. Tonsils 2/4.  Breath not malodorous   NECK: supple, non-tender  LUNGS: clear to auscultation bilaterally, no wheezes or rhonchi. Breathing is non labored.  CARDIO: RRR without murmur  EXTREMITIES: no cyanosis, clubbing or edema  LYMPH: mild anterior cervical and submandibular lymphadenopathy.  No posterior cervical or occipital lymphadenopathy.    Recent Results (from the past 24 hour(s))   Strep A Assay W/Optic    Collection Time: 04/24/24 12:24 PM   Result Value Ref Range    Strep Grp A Screen neg Negative    Control Line Present with a clear background (yes/no) yes Yes/No     Kit Lot # [id number] Numeric    Kit Expiration Date 3/1/25 Date         ASSESSMENT AND PLAN:     Encounter Diagnosis   Name Primary?    Sore throat Yes       Orders Placed This Encounter   Procedures    Strep A Assay W/Optic    Grp A Strep Cult, Throat       Meds & Refills for this Visit:  Requested Prescriptions      No prescriptions requested or ordered in this encounter       Imaging & Consults:  None.    Comfort measures explained and discussed as listed in Patient Instructions    Follow up in 3-5 days if not improving, condition worsens, or fever greater than or equal to 100.4 persists for 72 hours.      Patient Instructions   Your rapid strep was negative in the office today.   Your strep culture will be back in 72 hours.     Use OTC meds for comfort as needed--  Ibuprofen/Tylenol for fever/pain  Use Benadryl at bedtime to reduce drainage and promote rest.  Zyrtec/Claritin/Allegra in the AM to reduce nasal drainage without sedation.   Use saline nasal sprays to reduce congestion and thin secretions.   Use Delsym for cough.   Consider applying kyree's vapo-rub or eucayptus oil to chest and feet at bedtime to reduce chest and nasal congestion.   Warm tea with honey, cough lozenges, vaporizers/steam etc.    If no better in 2-3 days, follow-up with your PCP for further evaluation.       The patient/parent indicates understanding of these issues and agrees to the plan.  The patient is asked to follow up with their PCP as needed.

## 2024-04-24 NOTE — PATIENT INSTRUCTIONS
Your rapid strep was negative in the office today.   Your strep culture will be back in 72 hours.     Use OTC meds for comfort as needed--  Ibuprofen/Tylenol for fever/pain  Use Benadryl at bedtime to reduce drainage and promote rest.  Zyrtec/Claritin/Allegra in the AM to reduce nasal drainage without sedation.   Use saline nasal sprays to reduce congestion and thin secretions.   Use Delsym for cough.   Consider applying kyree's vapo-rub or eucayptus oil to chest and feet at bedtime to reduce chest and nasal congestion.   Warm tea with honey, cough lozenges, vaporizers/steam etc.    If no better in 2-3 days, follow-up with your PCP for further evaluation.

## 2024-06-07 ENCOUNTER — TELEMEDICINE (OUTPATIENT)
Dept: INTERNAL MEDICINE CLINIC | Facility: CLINIC | Age: 39
End: 2024-06-07
Payer: COMMERCIAL

## 2024-06-07 DIAGNOSIS — Z51.81 THERAPEUTIC DRUG MONITORING: Primary | ICD-10-CM

## 2024-06-07 DIAGNOSIS — E66.9 OBESITY (BMI 30-39.9): ICD-10-CM

## 2024-06-07 DIAGNOSIS — E78.5 HYPERLIPIDEMIA, UNSPECIFIED HYPERLIPIDEMIA TYPE: ICD-10-CM

## 2024-06-07 DIAGNOSIS — F43.9 STRESS: ICD-10-CM

## 2024-06-07 DIAGNOSIS — E55.9 VITAMIN D DEFICIENCY: ICD-10-CM

## 2024-06-07 DIAGNOSIS — R63.2 BINGE EATING: ICD-10-CM

## 2024-06-07 DIAGNOSIS — R73.03 PREDIABETES: ICD-10-CM

## 2024-06-07 PROCEDURE — 99213 OFFICE O/P EST LOW 20 MIN: CPT | Performed by: NURSE PRACTITIONER

## 2024-06-07 RX ORDER — METFORMIN HYDROCHLORIDE 500 MG/1
500 TABLET, EXTENDED RELEASE ORAL 2 TIMES DAILY WITH MEALS
Qty: 180 TABLET | Refills: 1 | Status: SHIPPED | OUTPATIENT
Start: 2024-06-07

## 2024-06-07 RX ORDER — PHENTERMINE HYDROCHLORIDE 37.5 MG/1
37.5 TABLET ORAL
Qty: 90 TABLET | Refills: 0 | Status: SHIPPED | OUTPATIENT
Start: 2024-06-07

## 2024-06-07 RX ORDER — TIRZEPATIDE 2.5 MG/.5ML
2.5 INJECTION, SOLUTION SUBCUTANEOUS WEEKLY
Qty: 2 ML | Refills: 1 | Status: SHIPPED | OUTPATIENT
Start: 2024-06-07

## 2024-06-07 NOTE — PATIENT INSTRUCTIONS
Next steps:  1.  Fill your prescribed medication and take as discussed and prescribed: zepbound 2.5mg weekly, phentermine and metformin   2.  Schedule a personal nutrition consultation with one of our registered dieticians     Please try to work on the following dietary changes:    1.  Drink water with meals and throughout the day, cut down on soda and/or juice if consumed. Consider flavored water options like Bubbly, Spindrift, Hint and Shay.  2.  Eat breakfast daily and focus on having protein with each meal, examples include: greek yogurt, cottage cheese, hard boiled egg, whole grain toast with peanut butter.   3.  Reduce refined carbohydrates and sugars which includes items such as sweets, as well as rice, pasta, and bread and make sure to choose whole grain options when having them with just 1 serving per meal about the size of your inner palm.  4.  Consume non starchy veggies daily working towards making them a good 50% of your daily food intake. Add them to lunch and dinner consistently.  5.  Start a daily probiotic: VSL#3 is recommended, (order on line at www.vsl3.com). Take 1 capsule daily with water for 30 days, then reduce to 1 every other day (this will reduce the cost). Capsules can be left out for 2 weeks, but then must be refrigerated.      Please download karla My Fitness Pal, LoseIt! Or Net Diary to monitor daily dietary intake and you will be able to see if you are eating the right amount of calories or too much or too little which would hinder weight loss. Additionally this will help to see your daily carbohydrate and protein intake. When you set the karla up choose 1-2 lbs/week as a goal.  Keeping a paper food journal is an option as well to remain accountable for your choices- this is the start to mindful eating! A low calorie diet has been consistently shown to support weight loss.     Continue or start exercising to help establish a routine. If not already exercising begin with 1 day and  progress as able with long-term goal of 30 minutes 5 days a week at a minimum.     Meditation daily can help manage and control stress. Chronic stress can make weight loss difficult.  Exercising is one way to help with stress, but meditation using the CALM Araceli or another comparable alternative can be done in your home or place of work with little time commitment. This Araceli can also help work on behavior change and improve sleep. Check out the segment under Calm Masterclass and listen to The 4 Pillars of Health. A great way to begin learning about the foundation of lifestyle with practical tips to use in your every day.     Check out www.yourweightmatters.org blog for continued daily support and education along this weight loss journey!    Patient Resources:     Personal Training/Fitness Classes/Health Coaching     [facility] @ [email or web address] Full fitness center with group fitness and personal training. Discount available as client of [facility].  Health Coaching and Personal Training with Mari Madrigal at our [facility]- individual weekly coaching with option to add personal training and small group fitness classes targeted at weight loss- [phone] and/or email @ [email or web address]  360FIT [place] [email or web address]. Group Fitness [phone] and/or email Ciarra at [email or web address]  [facility] Fitness Centers with multiple locations: [employer] (www.[employer] Media), Eat The Agrivi Fitness (www.LedgerPal Inc..TherOx), Fit Body Bootcamp (www.Your Survivalbodybootcamp.TherOx), [employer] Fitness (www.NoLimits Enterprises), The Exercise  (www.exercisecoach.TherOx)     Online Fitness  Fitness  on Utube  Fit in 10 DVD series- www.rkwvr48HSR.com  Sit and Be Fit - Chair exercise series Www.sitandbefit.org  Hip Hop Fit with Pedro Ravi at www.hiphopfit.net     Apps for on the Go  Fitness  [place] 7 Minute Workout (orange box with white 7) - free on the go HIIT training araceli  Peloton Araceli @ www.onepeloton.com     Nutrition Trackers and Tools  LoseIT! And My Fitness Pal apps and on line for tracking nutrition  NOOM - virtual health coaching  FitFoundation (healthy meals on the go) in [place] @ www.wmrzsncazevcx8z.LawnStarter  Bistro MD @ www.bistromd.com and Qgrbrf46 (keto and low carb plans recommended) @ www.zefybd86.com, Metabolic Meals @ www.MyMetabolicMeals.LawnStarter - individual prepared meals to go  Gobble, Blue Apron, Home , Every Plate, Sunbasket- on line meal delivery programs for preparation at home  Meal Village in [place] for homemade meals to go @ www.mealvillage.LawnStarter  Diet Doctor @ www.dietdoctor.com - low carb swaps  YummBright.md - meal prep and planning araceli (www.yummly.com)     Stress Management/Behavior/Mindful Eating  CALM meditation araceli (www.calm.com)  Headspace  Am I Hungry? Mindful eating virtual  araceli  Www.yourweightmatters.org - Obesity Action Coalition sponsored Blog posts daily  Motivation araceli (black box with white \")- daily supportive messages sent to your phone     Books/Video Education/Podcasts  Mindless Eating by Red Rome  Why We Get Sick by Luca Oliveira (a book about insulin resistance)  Atomic Habits by Samy Saucedo (a book about taking small steps to promote greater behavior change)   Can't Hurt Me by Fer Andrea (a book exploring the power of discipline in achieving your goals)  The End of Dieting: How to Live for Life by Dr. Alfa Pérez M.D. or listen to [employer]) Podcast Episode 63: Understanding \"Nutritarian\" Eating w/Dr. Alfa Pérez  Your Body in Balance: The New Science of Food, Hormones, and Health by Dr. Lc Khan  The Menopause Diet Plan by Iris Zheng and Dalia David  The Complete Guide to fasting by Dr. Hopkins  Sugar, Salt & Fat by Agatha Ahn, Ph.D, R.D.  Weight Loss Surgery Will Not Treat Food Addiction by Adelia Padilla  Ph.D  The Game Changers- [employer] Documentary on plant based nutrition  Fed Up - documentary about obesity (Free on Utube)  The Truth About Sugar - documentary on sugar (Free on Utube, https://youtu.be/7V8lrjzMV6g)  The Dr. Eng T5 Wellness Plan by Dr. Robert Eng MD  Fitlosophy Fitspiration - journal to better health (found at [employer] in fitness aisle)  What Happened to You?- a look at the impact trauma has on behavior written by Edwin Gardner and Dr. Macario Reilly  Whole Again by Marty Saini - discovering your true self after trauma  Kris Muñoz talk on [employer], The Call to Courage  Podcasts: The Exam Room by the Physician's Committee, Nutrition Facts by Dr. Olivarez    We are here to support you with weight loss, but please remember that you still need your primary care provider for your routine health maintenance.

## 2024-06-07 NOTE — PROGRESS NOTES
[facility] WEIGHT MANAGEMENT VIRTUAL ENCOUNTER     Lily Oconnell verbally consents to a Virtual/Telephone Check-In service on 06/07/24   Patient understands and accepts financial responsibility for any deductible, co-insurance and/or co-pays associated with this service.    HISTORY OF PRESENT ILLNESS  Chief Complaint   Patient presents with    Other     F/u on weight management      Lily Oconnell is a 39 year old female is being evaluated as a video visit using Telemedicine with live, interactive video and audio    Weight gain/loss since LOV based on home monitoring:   Home scale: 191.4 lbs   Has lost  #4 lbs since LOV 2 months ago     Compliance with medication: phentermine 37.5mg and metformin 500mg, zepbound 2.5mg (didn't start yet since it was sent to the wrong pharmacy)    Tolerating well, helping with decreasing appetite and no side effects     Started walking at lunch (doing 40 min)  Success:     Continuing to make better choices and scaling down portions   Challenging: The scale does not move   Exercise/Activity: 7x/ week, via walking, 1 day per week- strength training   Nutrition: eating regular meals, +protein, minimal veggies. not tracking reports  Stress is manageable   Sleep: 4-5 hours/night, waking up feeling tired     Denies chest pain, shortness of breath, dizziness, blurred vision, headache, paresthesia, nausea/vomiting.     Wt Readings from Last 6 Encounters:   04/24/24 195 lb (88.5 kg)   03/21/24 122 lb 3.2 oz (55.4 kg)   03/12/24 196 lb (88.9 kg)   10/30/23 194 lb (88 kg)   09/18/23 197 lb 3.2 oz (89.4 kg)   09/05/23 194 lb (88 kg)          Subjective  REVIEW OF SYSTEMS  GENERAL HEALTH: feels well otherwise, denied any fevers chills or night sweats   RESPIRATORY: denies shortness of breath   CARDIOVASCULAR: denies chest pain  GI: denies abdominal pain  PSYCH: denies any mood changes    Objective  EXAM  Reviewed most recent set of vitals   Physical Exam:  GENERAL: well developed, well  nourished, in no apparent distress, speaking in full sentences comfortably   SKIN: warm, pink, dry without rashes to exposed area   EYES: conjunctiva pink  HEENT: atraumatic, normocephalic  LUNGS: normal work of breathing, non labored  CARDIO: normal work, no exertion  EXTREMITIES: no cyanosis, no clubbing, no edema  NEURO: Oriented times three  PSYCH: pleasant, cooperative, normal mood and affect    Lab Results   Component Value Date    WBC 9.5 03/14/2024    RBC 4.75 03/14/2024    HGB 14.6 03/14/2024    HCT 42.4 03/14/2024    MCV 89.3 03/14/2024    MCH 30.7 03/14/2024    MCHC 34.4 03/14/2024    RDW 12.7 03/14/2024    .0 03/14/2024     Lab Results   Component Value Date    GLU 85 08/28/2023    BUN 10 08/28/2023    BUNCREA 14.7 07/24/2021    CREATSERUM 0.95 08/28/2023    ANIONGAP 3 08/28/2023    GFRNAA 77 03/15/2022    GFRAA 88 03/15/2022    CA 8.6 08/28/2023    OSMOCALC 280 08/28/2023    ALKPHO 82 08/28/2023    AST 15 08/28/2023    ALT 25 08/28/2023    BILT 0.3 08/28/2023    TP 7.0 08/28/2023    ALB 3.4 08/28/2023    GLOBULIN 3.6 08/28/2023     08/28/2023    K 3.6 08/28/2023     08/28/2023    CO2 26.0 08/28/2023     Lab Results   Component Value Date     08/28/2023    A1C 5.7 (H) 08/28/2023     Lab Results   Component Value Date    CHOLEST 205 (H) 08/28/2023    TRIG 147 08/28/2023    HDL 54 08/28/2023     (H) 08/28/2023    VLDL 26 08/28/2023    NONHDLC 151 (H) 08/28/2023     Lab Results   Component Value Date    T4F 1.0 03/15/2022    TSH 2.530 08/28/2023     Lab Results   Component Value Date    B12 417 08/28/2023     Lab Results   Component Value Date    VITD 30.6 08/28/2023       Current Outpatient Medications on File Prior to Visit   Medication Sig Dispense Refill    Phentermine HCl 37.5 MG Oral Tab TAKE 1 TABLET(37.5 MG) BY MOUTH EVERY MORNING BEFORE BREAKFAST 30 tablet 2    Tirzepatide-Weight Management (ZEPBOUND) 2.5 MG/0.5ML Subcutaneous Solution Auto-injector Inject 2.5 mg  into the skin once a week. (Patient not taking: Reported on 3/21/2024) 2 mL 0    METFORMIN  MG Oral Tablet 24 Hr TAKE 1 TABLET(500 MG) BY MOUTH TWICE DAILY WITH MEALS (Patient not taking: Reported on 3/21/2024) 60 tablet 2     No current facility-administered medications on file prior to visit.       ASSESSMENT  Analyzed weight data:       Diagnoses and all orders for this visit:    Therapeutic drug monitoring    Obesity (BMI 30-39.9)    Hyperlipidemia, unspecified hyperlipidemia type    Vitamin D deficiency    Prediabetes    Stress    Binge eating        PLAN  Continue with medications: zepbound 2.5mg weekly  Continue with medication: phentermine 37.5mg  Continue with medications: metformin 500mg bid  --advised of side effects and adverse effects of this medication  Contradictions: has done metformin in the past, vyvanse    Reviewed labs  Will continue with vitamin d OTC  Binge eating, stable  HLD  stable, follows with PCP   Hx of prediabetes, reviewed last a1c 5.7% on 8/2023  Stress, discussed ways to help reduce this again- see HPI  Sleep hygiene discussed and ways to help with this  Wrote out macros and encouraged to track food   Advised to monitor blood pressure and pulse at home/ given parameters to review and contact provider.  Nutrition: low carb diet/ recommended to eat breakfast daily/ regular protein intake  Follow up with dietitian and psychologist as recommended.  Discussed the role of sleep and stress in weight management.  Counseled on comprehensive weight loss plan including attention to nutrition, exercise and behavior/stress management for success. See patient instruction below for more details.  Discussed strategies to overcome barriers to successful weight loss and weight maintenance  FITTE: ACSM recommendations (150-300 minutes/ week in active weight loss)       There are no Patient Instructions on file for this visit.    No follow-ups on file.    Patient verbalizes understanding.    Total  time spent on chart review, pre-charting, obtaining history, counseling, and educating, reviewing labs was 23 minutes.       Pt understands phone/video evaluation is not a substitute for face to face examination or emergency care. Pt advised to go to the ER or call 911 for worsening symptoms or acute distress.       Please note that the following visit was completed using two-way, real-time interactive audio and/or video communication.  This has been done in good pat to provide continuity of care in the best interest of the provider-patient relationship, due to the ongoing public health crisis/national emergency and because of restrictions of visitation.  There are limitations of this visit as no physical exam could be performed.  Every conscious effort was taken to allow for sufficient and adequate time.  This billing was spent on reviewing labs, medications, radiology tests and decision making.  Appropriate medical decision-making and tests are ordered as detailed in the plan of care above.     NOTE TO PATIENT: The 21st Century Cures Act makes clinical notes like these available to patients in the interest of transparency. Clinical notes are medical documents used by physicians and care providers to communicate with each other. These documents include medical language and terminology, abbreviations, and treatment information that may sound technical and at times possibly unfamiliar. In addition, at times, the verbiage may appear blunt or direct. These documents are one tool providers use to communicate relevant information and clinical opinions of the care providers in a way that allows common understanding of the clinical context.     Yoselyn East, APRN  6/7/2024

## 2024-06-19 ENCOUNTER — PATIENT MESSAGE (OUTPATIENT)
Dept: INTERNAL MEDICINE CLINIC | Facility: CLINIC | Age: 39
End: 2024-06-19

## 2024-06-19 NOTE — TELEPHONE ENCOUNTER
Approved    Prior authorization approved  Payer: [employer] [facility] Case ID: [id number]    [phone]    [phone]  Note from payer: CaseId:[id number];Status:Approved;Review Type:Prior Auth;Coverage Start Date:05/20/2024;Coverage End Date:02/14/2025;  Approval Details    Authorized from May 20, 2024 to February 14, 2025  Electronic appeal: Not supported  View History  Medication Being Authorized    Tirzepatide-Weight Management (ZEPBOUND) 2.5 MG/0.5ML Subcutaneous Solution Auto-injector  Inject 2.5 mg into the skin once a week.  Dispense: 2 mL Refills: 1   Start: 6/7/2024   Class: Normal Diagnoses: Therapeutic drug monitoring; Obesity (BMI 30-39.9); Hyperlipidemia, unspecified hyperlipidemia type   This order has been released to its destination.  To be filled at: [facility] [address] [place] [address] WAQAS [place] [phone], [phone]  
From: Lily Oconnell  To: Yoselyn East  Sent: 6/19/2024 12:29 PM CDT  Subject: Rx    [facility] said all they had was metformin. Nothing else was ready or prescribed.   
no

## 2024-07-27 DIAGNOSIS — E66.9 OBESITY (BMI 30-39.9): ICD-10-CM

## 2024-07-27 DIAGNOSIS — Z51.81 THERAPEUTIC DRUG MONITORING: ICD-10-CM

## 2024-07-27 DIAGNOSIS — E78.5 HYPERLIPIDEMIA, UNSPECIFIED HYPERLIPIDEMIA TYPE: ICD-10-CM

## 2024-07-29 NOTE — TELEPHONE ENCOUNTER
Requesting   Requested Prescriptions     Pending Prescriptions Disp Refills    Tirzepatide-Weight Management (ZEPBOUND) 2.5 MG/0.5ML Subcutaneous Solution Auto-injector 2 mL 1     Sig: Inject 2.5 mg into the skin once a week.       LOV: 06/07/2024  RTC: in about 5 months  Filled: 06/07/2024 #2ml with 1 refills    Future Appointments   Date Time Provider Department Center   9/19/2024  1:45 PM Danilo Spears MD  [facility]M [facility]   9/19/2024  2:00 PM  [place] RN4  CHEMO [facility]   10/30/2024 11:40 AM Yoselyn East APRN [facility] [address]     186.6 this morning.     Yeah, I didn’t notice that I needed to stop eating just before I felt like I should because if I didn’t I would get very bad indigestion and a little nauseous. I did three over the course of the month because I dropped the box and the first injection actually broke. So it was 7/1, 7/10 and 7/20. I don’t think we need to go up this month.

## 2024-07-31 RX ORDER — TIRZEPATIDE 2.5 MG/.5ML
2.5 INJECTION, SOLUTION SUBCUTANEOUS WEEKLY
Qty: 2 ML | Refills: 1 | Status: SHIPPED | OUTPATIENT
Start: 2024-07-31

## 2024-08-01 ENCOUNTER — PATIENT MESSAGE (OUTPATIENT)
Dept: INTERNAL MEDICINE CLINIC | Facility: CLINIC | Age: 39
End: 2024-08-01

## 2024-08-01 NOTE — TELEPHONE ENCOUNTER
From: Lily Oconnell  To: Yoselyn East  Sent: 8/1/2024 2:45 PM CDT  Subject: Zep bound    My insurance approved it last month and is now saying it’s not covered. Can it be submitted for approval.

## 2024-08-02 NOTE — TELEPHONE ENCOUNTER
JEAN CLAUDE needed for zepbound 2.5 mg  [facility] and spoke to [place]  Id  [id number]       Obesity (BMI 30-39.9) E66.9    Hyperlipidemia, unspecified hyperlipidemia type E78.5    Vitamin D deficiency E55.9    Prediabetes R73.03    Stress F43.9    Binge eating R63.2      Case # [id number]  Approved for one time override 7/3/24 to 9/1/2024

## 2024-08-29 DIAGNOSIS — Z51.81 THERAPEUTIC DRUG MONITORING: ICD-10-CM

## 2024-08-29 DIAGNOSIS — E66.9 OBESITY (BMI 30-39.9): ICD-10-CM

## 2024-08-29 DIAGNOSIS — E78.5 HYPERLIPIDEMIA, UNSPECIFIED HYPERLIPIDEMIA TYPE: ICD-10-CM

## 2024-08-29 RX ORDER — TIRZEPATIDE 2.5 MG/.5ML
2.5 INJECTION, SOLUTION SUBCUTANEOUS WEEKLY
Qty: 2 ML | Refills: 1 | Status: CANCELLED | OUTPATIENT
Start: 2024-08-29

## 2024-08-30 NOTE — TELEPHONE ENCOUNTER
Requesting increase  Requested Prescriptions     Pending Prescriptions Disp Refills    Tirzepatide-Weight Management (ZEPBOUND) 5 MG/0.5ML Subcutaneous Solution Auto-injector 2 mL 0     Sig: Inject 5 mg into the skin once a week for 4 doses.       LOV: 06/07/2024  RTC: in about 5 months  Filled: 07/31/2024 #2ml with 1 refills    Future Appointments   Date Time Provider Department Center   9/16/2024  7:00 AM REF NO [place] REF [id number]   9/19/2024  1:45 PM Danilo Spears MD  HEM [facility]   9/19/2024  2:00 PM  [place] RN4 [facility] CHEMO [facility]   10/30/2024 11:40 AM Yoselyn East APRN [facility]     Patient Comment: I think I have to go up in dose because of my insurance. But my last dose was last week. Lisette tolerated it well. My current weight is 180.

## 2024-09-03 RX ORDER — TIRZEPATIDE 5 MG/.5ML
5 INJECTION, SOLUTION SUBCUTANEOUS WEEKLY
Qty: 2 ML | Refills: 1 | Status: SHIPPED | OUTPATIENT
Start: 2024-09-03 | End: 2024-09-25

## 2024-09-04 ENCOUNTER — PATIENT MESSAGE (OUTPATIENT)
Dept: INTERNAL MEDICINE CLINIC | Facility: CLINIC | Age: 39
End: 2024-09-04

## 2024-09-04 NOTE — TELEPHONE ENCOUNTER
From: Lily Oconnell  To: Yoselyn East  Sent: 9/4/2024 11:33 AM CDT  Subject: Zep bound    Hi. The pharmacy said after two months on the 2.5 you have to go up or insurance won’t cover it. I don’t know of a better way to do that than to my chart message. My current weight is 179. I’ve done two months of the lowest dose of zep bound and have ran out and gone a week without twice due to insurance.

## 2024-09-16 ENCOUNTER — LAB ENCOUNTER (OUTPATIENT)
Dept: LAB | Age: 39
End: 2024-09-16
Attending: INTERNAL MEDICINE
Payer: COMMERCIAL

## 2024-09-16 DIAGNOSIS — D50.0 IRON DEFICIENCY ANEMIA DUE TO CHRONIC BLOOD LOSS: ICD-10-CM

## 2024-09-16 LAB
BASOPHILS # BLD AUTO: 0.04 X10(3) UL (ref 0–0.2)
BASOPHILS NFR BLD AUTO: 0.4 %
DEPRECATED HBV CORE AB SER IA-ACNC: 164.3 NG/ML
EOSINOPHIL # BLD AUTO: 0.2 X10(3) UL (ref 0–0.7)
EOSINOPHIL NFR BLD AUTO: 2.2 %
ERYTHROCYTE [DISTWIDTH] IN BLOOD BY AUTOMATED COUNT: 12.3 %
HCT VFR BLD AUTO: 42.5 %
HGB BLD-MCNC: 14.9 G/DL
IMM GRANULOCYTES # BLD AUTO: 0.03 X10(3) UL (ref 0–1)
IMM GRANULOCYTES NFR BLD: 0.3 %
IRON SATN MFR SERPL: 23 %
IRON SERPL-MCNC: 61 UG/DL
LYMPHOCYTES # BLD AUTO: 2.13 X10(3) UL (ref 1–4)
LYMPHOCYTES NFR BLD AUTO: 23.3 %
MCH RBC QN AUTO: 31.8 PG (ref 26–34)
MCHC RBC AUTO-ENTMCNC: 35.1 G/DL (ref 31–37)
MCV RBC AUTO: 90.6 FL
MONOCYTES # BLD AUTO: 0.58 X10(3) UL (ref 0.1–1)
MONOCYTES NFR BLD AUTO: 6.4 %
NEUTROPHILS # BLD AUTO: 6.15 X10 (3) UL (ref 1.5–7.7)
NEUTROPHILS # BLD AUTO: 6.15 X10(3) UL (ref 1.5–7.7)
NEUTROPHILS NFR BLD AUTO: 67.4 %
PLATELET # BLD AUTO: 363 10(3)UL (ref 150–450)
RBC # BLD AUTO: 4.69 X10(6)UL
TOTAL IRON BINDING CAPACITY: 264 UG/DL (ref 250–425)
TRANSFERRIN SERPL-MCNC: 168 MG/DL (ref 250–380)
WBC # BLD AUTO: 9.1 X10(3) UL (ref 4–11)

## 2024-09-16 PROCEDURE — 82728 ASSAY OF FERRITIN: CPT

## 2024-09-16 PROCEDURE — 36415 COLL VENOUS BLD VENIPUNCTURE: CPT

## 2024-09-16 PROCEDURE — 83540 ASSAY OF IRON: CPT

## 2024-09-16 PROCEDURE — 85025 COMPLETE CBC W/AUTO DIFF WBC: CPT

## 2024-09-16 PROCEDURE — 83550 IRON BINDING TEST: CPT

## 2024-09-19 ENCOUNTER — APPOINTMENT (OUTPATIENT)
Dept: HEMATOLOGY/ONCOLOGY | Facility: HOSPITAL | Age: 39
End: 2024-09-19
Attending: INTERNAL MEDICINE
Payer: COMMERCIAL

## 2024-10-03 ENCOUNTER — PATIENT MESSAGE (OUTPATIENT)
Dept: INTERNAL MEDICINE CLINIC | Facility: CLINIC | Age: 39
End: 2024-10-03

## 2024-10-03 DIAGNOSIS — E66.9 OBESITY (BMI 30-39.9): ICD-10-CM

## 2024-10-03 DIAGNOSIS — E66.9 OBESITY (BMI 30-39.9): Primary | ICD-10-CM

## 2024-10-03 NOTE — TELEPHONE ENCOUNTER
Requesting zepbound  LOV: 6/7/24  RTC: 5 months  Last Relevant Labs: na  Filled: 9/3/24 #2 ml with 1 refills    Future Appointments   Date Time Provider Department Center   10/30/2024 11:40 AM Yoselyn East APRN [facility]

## 2024-10-04 RX ORDER — TIRZEPATIDE 5 MG/.5ML
5 INJECTION, SOLUTION SUBCUTANEOUS WEEKLY
Qty: 2 ML | Refills: 1 | Status: SHIPPED | OUTPATIENT
Start: 2024-10-04

## 2024-10-04 RX ORDER — PHENTERMINE HYDROCHLORIDE 37.5 MG/1
37.5 TABLET ORAL
Qty: 90 TABLET | Refills: 0 | Status: SHIPPED | OUTPATIENT
Start: 2024-10-04

## 2024-10-04 RX ORDER — METFORMIN HCL 500 MG
500 TABLET, EXTENDED RELEASE 24 HR ORAL 2 TIMES DAILY WITH MEALS
Qty: 180 TABLET | Refills: 1 | Status: SHIPPED | OUTPATIENT
Start: 2024-10-04

## 2024-10-04 NOTE — TELEPHONE ENCOUNTER
Requesting   Requested Prescriptions     Pending Prescriptions Disp Refills    metFORMIN  MG Oral Tablet 24 Hr 180 tablet 1     Sig: Take 1 tablet (500 mg total) by mouth 2 (two) times daily with meals.       LOV: 06/07/2024  RTC: in about 5 months  Filled: 06/07/2024 #180 with 1 refills    Requesting    Phentermine HCl 37.5 MG Oral Tab 90 tablet 0     Sig: Take 1 tablet (37.5 mg total) by mouth before breakfast.     LOV: 06/07/2024  RTC: in about 5 months  Filled: 07/15/2024 #90 with 0 refills    Future Appointments   Date Time Provider Department Center   10/30/2024 11:40 AM Yoselyn East APRN [facility]

## 2024-10-30 ENCOUNTER — OFFICE VISIT (OUTPATIENT)
Dept: INTERNAL MEDICINE CLINIC | Facility: CLINIC | Age: 39
End: 2024-10-30
Payer: COMMERCIAL

## 2024-10-30 VITALS
WEIGHT: 168 LBS | HEIGHT: 62.5 IN | SYSTOLIC BLOOD PRESSURE: 118 MMHG | RESPIRATION RATE: 16 BRPM | HEART RATE: 102 BPM | BODY MASS INDEX: 30.14 KG/M2 | DIASTOLIC BLOOD PRESSURE: 78 MMHG

## 2024-10-30 DIAGNOSIS — R73.03 PREDIABETES: ICD-10-CM

## 2024-10-30 DIAGNOSIS — Z51.81 THERAPEUTIC DRUG MONITORING: Primary | ICD-10-CM

## 2024-10-30 DIAGNOSIS — E66.9 OBESITY (BMI 30-39.9): ICD-10-CM

## 2024-10-30 DIAGNOSIS — E78.5 HYPERLIPIDEMIA, UNSPECIFIED HYPERLIPIDEMIA TYPE: ICD-10-CM

## 2024-10-30 DIAGNOSIS — R63.2 BINGE EATING: ICD-10-CM

## 2024-10-30 DIAGNOSIS — F43.9 STRESS: ICD-10-CM

## 2024-10-30 DIAGNOSIS — E55.9 VITAMIN D DEFICIENCY: ICD-10-CM

## 2024-10-30 PROCEDURE — 3078F DIAST BP <80 MM HG: CPT | Performed by: NURSE PRACTITIONER

## 2024-10-30 PROCEDURE — 99214 OFFICE O/P EST MOD 30 MIN: CPT | Performed by: NURSE PRACTITIONER

## 2024-10-30 PROCEDURE — 3074F SYST BP LT 130 MM HG: CPT | Performed by: NURSE PRACTITIONER

## 2024-10-30 PROCEDURE — 3008F BODY MASS INDEX DOCD: CPT | Performed by: NURSE PRACTITIONER

## 2024-10-30 RX ORDER — TIRZEPATIDE 5 MG/.5ML
5 INJECTION, SOLUTION SUBCUTANEOUS WEEKLY
Qty: 2 ML | Refills: 3 | Status: SHIPPED | OUTPATIENT
Start: 2024-10-30

## 2024-10-30 NOTE — PROGRESS NOTES
HISTORY OF PRESENT ILLNESS  Chief Complaint   Patient presents with    Weight Check     -23     Lily Oconnell is a 39 year old female here for follow up with medical weight loss program for the treatment of overweight, obesity, or morbid obesity.     Down 23 lbs (f/u from 6/2023 via telemedicine)   Compliant on phentermine 15mg and metformin 500mg bid, zepbound 5mg   Tolerating well, helping with decreasing appetite and no side effects     Success:     Planning out all meals and snacks to avoid temptation   Challenging:     Finding the time to lift weights. However, very recently that has changed. So now ill have evone.   Dad passed away a couple of weeks ago, will be able to have some time for exercise and herself   Exercise/Activity: 7x/ week, via walking, 1 day per week- strength training Nutrition: eating regular meals, +protein, minimal veggies. not tracking reports  Meals out per week on average: 1  Stress is manageable   Sleep: 6 hours/night, waking up feeling tired (life)     Denies chest pain, shortness of breath, dizziness, blurred vision, headache, paresthesia, nausea/vomiting.     Breakfast Lunch Dinner Snacks Fluids   Reviewed              Wt Readings from Last 6 Encounters:   10/30/24 168 lb (76.2 kg)   04/24/24 195 lb (88.5 kg)   03/21/24 122 lb 3.2 oz (55.4 kg)   03/12/24 196 lb (88.9 kg)   10/30/23 194 lb (88 kg)   09/18/23 197 lb 3.2 oz (89.4 kg)          REVIEW OF SYSTEMS  GENERAL: feels well otherwise, denied any fevers chills or night sweats   LUNGS: denies shortness of breath  CARDIOVASCULAR: denies chest pain  GI: denies abdominal pain  MUSCULOSKELETAL: denies back pain, joint pains   PSYCH: denies change in behavior or mood, denies feeling sad or depressed    EXAM  /78   Pulse 102   Resp 16   Ht 5' 2.5\" (1.588 m)   Wt 168 lb (76.2 kg)   LMP 10/10/2024 (Approximate)   BMI 30.24 kg/m²       GENERAL: well developed, well nourished, in no apparent distress, A/O x3  SKIN: no rashes,  no suspicious lesions  HEENT: atraumatic, normocephalic, OP-clear, PERRLA  NECK: supple, no adenopathy  LUNGS: CTA in all fields, breathing non labored  CARDIO: RRR without murmur  GI: +BS, NT/ND, no masses or HSM  EXTREMITIES: no cyanosis, no clubbing, no edema    Lab Results   Component Value Date    GLU 85 08/28/2023    BUN 10 08/28/2023    BUNCREA 14.7 07/24/2021    CREATSERUM 0.95 08/28/2023    ANIONGAP 3 08/28/2023    GFRNAA 77 03/15/2022    GFRAA 88 03/15/2022    CA 8.6 08/28/2023    OSMOCALC 280 08/28/2023    ALKPHO 82 08/28/2023    AST 15 08/28/2023    ALT 25 08/28/2023    BILT 0.3 08/28/2023    TP 7.0 08/28/2023    ALB 3.4 08/28/2023    GLOBULIN 3.6 08/28/2023     08/28/2023    K 3.6 08/28/2023     08/28/2023    CO2 26.0 08/28/2023     Lab Results   Component Value Date     08/28/2023    A1C 5.7 (H) 08/28/2023     Lab Results   Component Value Date    CHOLEST 205 (H) 08/28/2023    TRIG 147 08/28/2023    HDL 54 08/28/2023     (H) 08/28/2023    VLDL 26 08/28/2023    NONHDLC 151 (H) 08/28/2023     Lab Results   Component Value Date    B12 417 08/28/2023     Lab Results   Component Value Date    VITD 30.6 08/28/2023       Medications Ordered Prior to Encounter[1]    ASSESSMENT/PLAN    ICD-10-CM    1. Therapeutic drug monitoring  Z51.81       2. Obesity (BMI 30-39.9)  E66.9       3. Hyperlipidemia, unspecified hyperlipidemia type  E78.5       4. Vitamin D deficiency  E55.9       5. Prediabetes  R73.03       6. Stress  F43.9       7. Binge eating  R63.2           PLAN   Initial Weight Data and Goal Weight Loss:  Initial consult: # 215lbs on 3/2022  Weight Calculations  Initial Weight: 215 lbs  Initial Weight Date: 03/01/22  Today's Weight: 168 lbs  5% Goal: 10.75  10% Goal: 21.5  Total Weight Loss: 47 lbs  Total weight loss: Down 23 lbs total, Net loss 47 lbs  Continue with medications: zepbound 5mg weekly (will stay at this dosage)   Continue with medication: phentermine 15mg (is  cutting 37.5mg in half daily)  Continue with medications: metformin 500mg (will reduce 1 time per day)   --advised of side effects and adverse effects of this medication  Contradictions: has done metformin in the past, vyvanse    Reviewed labs- added in a1c  Will continue with vitamin d OTC  Binge eating, stable  HLD  stable, follows with PCP   Hx of prediabetes, reviewed last a1c 5.7% on 8/2023  Stress, see HPI, should be improving soon   Sleep hygiene discussed and ways to help with this  Wrote out macros and encouraged to track food   Nutrition: Low carb diet, recommended to eat breakfast daily/ regular protein intake  Follow up with dietitian and psychologist as recommended.  Discussed the role of sleep and stress in weight management.  Counseled on comprehensive weight loss plan including attention to nutrition, exercise and behavior/stress management for success. See patient instruction below for more details.  Discussed strategies to overcome barriers to successful weight loss and weight maintenance  FITTE: ACSM recommendations (150-300 minutes/ week in active weight loss)   Weight Loss Consent to treat reviewed and signed.    Total time spent on chart review, pre-charting, obtaining history, counseling, and educating, reviewing labs was 30 minutes.       NOTE TO PATIENT: The 21st Century Cures Act makes clinical notes like these available to patients in the interest of transparency. Clinical notes are medical documents used by physicians and care providers to communicate with each other. These documents include medical language and terminology, abbreviations, and treatment information that may sound technical and at times possibly unfamiliar. In addition, at times, the verbiage may appear blunt or direct. These documents are one tool providers use to communicate relevant information and clinical opinions of the care providers in a way that allows common understanding of the clinical context.     There are no  Patient Instructions on file for this visit.    No follow-ups on file.    Patient verbalizes understanding.    MELVA Calvert             [1]   Current Outpatient Medications on File Prior to Visit   Medication Sig Dispense Refill    metFORMIN  MG Oral Tablet 24 Hr Take 1 tablet (500 mg total) by mouth 2 (two) times daily with meals. 180 tablet 1    Phentermine HCl 37.5 MG Oral Tab Take 1 tablet (37.5 mg total) by mouth before breakfast. 90 tablet 0    Tirzepatide-Weight Management (ZEPBOUND) 5 MG/0.5ML Subcutaneous Solution Auto-injector Inject 5 mg into the skin once a week. 2 mL 1     No current facility-administered medications on file prior to visit.

## 2024-10-30 NOTE — PATIENT INSTRUCTIONS
Next steps:  1.  Fill your prescribed medication and take as discussed and prescribed: zepbound 5mg weekly and phentermine, and metformin (start to wean off and stop)   2.  Schedule a personal nutrition consultation with one of our registered dieticians  3. Get A1c done     Please try to work on the following dietary changes:  Daily protein recommendation to start:  grams  Daily carbohydrate: <105g  Daily calories: 1,200-1,300  1.  Drink water with meals and throughout the day, cut down on soda and/or juice if consumed. Consider flavored water options like Bubbly, Spindrift, Hint and Shay.  2.  Eat breakfast daily and focus on having protein with each meal, examples include: greek yogurt, cottage cheese, hard boiled egg, whole grain toast with peanut butter.   3.  Reduce refined carbohydrates and sugars which includes items such as sweets, as well as rice, pasta, and bread and make sure to choose whole grain options when having them with just 1 serving per meal about the size of your inner palm.  4.  Consume non starchy veggies daily working towards making them a good 50% of your daily food intake. Add them to lunch and dinner consistently.  5.  Start a daily probiotic: VSL#3 is recommended, (order on line at www.vsl3.com). Take 1 capsule daily with water for 30 days, then reduce to 1 every other day (this will reduce the cost). Capsules can be left out for 2 weeks, but then must be refrigerated.      Please download karla My Fitness Pal, LoseIt! Or Net Diary to monitor daily dietary intake and you will be able to see if you are eating the right amount of calories or too much or too little which would hinder weight loss. Additionally this will help to see your daily carbohydrate and protein intake. When you set the karla up choose 1-2 lbs/week as a goal.  Keeping a paper food journal is an option as well to remain accountable for your choices- this is the start to mindful eating! A low calorie diet has been  consistently shown to support weight loss.     Continue or start exercising to help establish a routine. If not already exercising begin with 1 day and progress as able with long-term goal of 30 minutes 5 days a week at a minimum.     Meditation daily can help manage and control stress. Chronic stress can make weight loss difficult.  Exercising is one way to help with stress, but meditation using the CALM Araceli or another comparable alternative can be done in your home or place of work with little time commitment. This Araceli can also help work on behavior change and improve sleep. Check out the segment under Calm Masterclass and listen to The 4 Pillars of Health. A great way to begin learning about the foundation of lifestyle with practical tips to use in your every day.     Check out www.yourweightmatters.org blog for continued daily support and education along this weight loss journey!    Patient Resources:     Personal Training/Fitness Classes/Health Coaching     [facility] @ [email or web address]-driven/[email or web address]-center Full fitness center with group fitness and personal training. Discount available as client of [facility].  Health Coaching and Personal Training with Mari Madrigal at our [place] [facility]- individual weekly coaching with option to add personal training and small group fitness classes targeted at weight loss- [phone] and/or email @ [email or web address]  360FIT [place] [email or web address]://www.[email or web address] Online. Group Fitness [phone] and/or email Ciarra at [email or web address]  [facility] Fitness Centers with multiple locations: Travel Appeal (www.Devver), Eat The Frog Fitness (www.web2media.sk.Kitman Labs), Fit Body Bootcamp (www.Member Deskbodybootcamp.Kitman Labs), ViroXis Fitness (www.Zoove.Kitman Labs), The Exercise  (www.exercisecoach.Kitman Labs)     Online Fitness  Fitness  on Twenga  Fit in 10 DVD series-  www.zcgfv78HNR.Linebacker  Sit and Be Fit - Chair exercise series Www.sitandbefit.org  Hip Hop Fit with Pedro Ravi at www.hiphopfit.net     Apps for on the Go Fitness  [place] 7 Minute Workout (orange box with white 7) - free on the go HIIT training araceli  Peloton Araceli @ www.onepeloton.com     Nutrition Trackers and Tools  LoseIT! And My Fitness Pal apps and on line for tracking nutrition  NOOM - virtual health coaching  FitFoundation (healthy meals on the go) in [place] @ www.pocukhgmcssgo0mAdvanova  Bisshaka MONROY @ wwwZadegobistromd.com and Obgdpk24 (keto and low carb plans recommended) @ www.wizctm19.com, Metabolic Meals @ www.MyMetabolicMeals.com - individual prepared meals to go  Gobble, Blue Apron, Home , Every Plate, Sunbasket- on line meal delivery programs for preparation at home  Meal Village in [place] for homemade meals to go @ wwwZadegomealIntellect Neurosciences  Diet Doctor @ www.dietdoctor.com - low carb swaps  Yummly - meal prep and planning araceli (www.yummly.com)     Stress Management/Behavior/Mindful Eating  CALM meditation araceli (www.calm.com)  Headspace  Am I Hungry? Mindful eating virtual  araceli  Www.yourweightmatters.org - Obesity Action Coalition sponsored Blog posts daily  Motivation araceli (black box with white \")- daily supportive messages sent to your phone     Books/Video Education/Podcasts  Mindless Eating by Red Rome  Why We Get Sick by Luca Oliveira (a book about insulin resistance)  Atomic Habits by Samy Saucedo (a book about taking small steps to promote greater behavior change)   Can't Hurt Me by Fer Andrea (a book exploring the power of discipline in achieving your goals)  The End of Dieting: How to Live for Life by Dr. Alfa Pérez M.D. or listen to The mPowa Podcast Episode 63: Understanding \"Nutritarian\" Eating w/Dr. Alfa Pérez  Your Body in Balance: The New Science of Food, Hormones, and Health by Dr. Lc Khan  The Menopause Diet Plan by Iris Zheng and Dalia David  The Complete  Guide to fasting by Dr. Hopkins  Sugar, Salt & Fat by Agatha Ahn, Ph.D, R.D.  Weight Loss Surgery Will Not Treat Food Addiction by Adelia Padilla Ph.D  The Game Changers- Netflix Documentary on plant based nutrition  Fed Up - documentary about obesity (Free on Utube)  The Truth About Sugar - documentary on sugar (Free on Utube, https://youtu.be/4B2lzsuMO5w)  The Dr. Eng T5 Wellness Plan by Dr. Robert Eng MD  Fitlosophy Fitspiration - journal to better health (found at [employer] in fitness aisle)  What Happened to You?- a look at the impact trauma has on behavior written by Edwin Gardner and Dr. Macario Reilly  Whole Again by Marty Saini - discovering your true self after trauma  Kris Muñoz talk on Netflix, The Call to Courage  Podcasts: The Exam Room by the Physician's Committee, Nutrition Facts by Dr. Olivarez    We are here to support you with weight loss, but please remember that you still need your primary care provider for your routine health maintenance.

## 2024-12-27 ENCOUNTER — PATIENT MESSAGE (OUTPATIENT)
Dept: INTERNAL MEDICINE CLINIC | Facility: CLINIC | Age: 39
End: 2024-12-27

## 2024-12-30 ENCOUNTER — LAB ENCOUNTER (OUTPATIENT)
Dept: LAB | Age: 39
End: 2024-12-30
Attending: NURSE PRACTITIONER
Payer: COMMERCIAL

## 2024-12-30 DIAGNOSIS — Z51.81 THERAPEUTIC DRUG MONITORING: ICD-10-CM

## 2024-12-30 DIAGNOSIS — R73.03 PREDIABETES: ICD-10-CM

## 2024-12-30 DIAGNOSIS — E66.9 OBESITY (BMI 30-39.9): ICD-10-CM

## 2024-12-30 LAB
EST. AVERAGE GLUCOSE BLD GHB EST-MCNC: 97 MG/DL (ref 68–126)
HBA1C MFR BLD: 5 % (ref ?–5.7)

## 2024-12-30 PROCEDURE — 83036 HEMOGLOBIN GLYCOSYLATED A1C: CPT | Performed by: NURSE PRACTITIONER

## 2025-03-10 ENCOUNTER — PATIENT MESSAGE (OUTPATIENT)
Dept: INTERNAL MEDICINE CLINIC | Facility: CLINIC | Age: 40
End: 2025-03-10

## 2025-03-10 DIAGNOSIS — E66.9 OBESITY (BMI 30-39.9): Primary | ICD-10-CM

## 2025-03-11 RX ORDER — TIRZEPATIDE 7.5 MG/.5ML
7.5 INJECTION, SOLUTION SUBCUTANEOUS WEEKLY
Qty: 2 ML | Refills: 1 | Status: SHIPPED | OUTPATIENT
Start: 2025-03-11 | End: 2025-04-02

## 2025-03-11 NOTE — TELEPHONE ENCOUNTER
Approved    Prior authorization approved  Payer: [employer] Case ID: [id number]    [phone]    [phone]  Note from payer: CaseId:[id number];Status:Approved;Review Type:Prior Auth;Coverage Start Date:02/09/2025;Coverage End Date:03/11/2026;  Approval Details    Authorized from February 9, 2025 to March 11, 2026

## 2025-03-11 NOTE — TELEPHONE ENCOUNTER
Requesting zepbound increase  LOV: 10/30/24  RTC: 5 months  Last Relevant Labs: na  Filled: 10/30/24 #2ml with 3 refills  5 mg dose    Future Appointments   Date Time Provider Department Center   3/18/2025  8:00 AM Yoselyn East APRN [facility]   6/16/2025  3:40 PM Yoseyln East APRN [facility]

## 2025-03-18 ENCOUNTER — OFFICE VISIT (OUTPATIENT)
Dept: INTERNAL MEDICINE CLINIC | Facility: CLINIC | Age: 40
End: 2025-03-18
Payer: COMMERCIAL

## 2025-03-18 VITALS
OXYGEN SATURATION: 97 % | BODY MASS INDEX: 29.79 KG/M2 | WEIGHT: 166 LBS | HEIGHT: 62.5 IN | DIASTOLIC BLOOD PRESSURE: 72 MMHG | RESPIRATION RATE: 16 BRPM | HEART RATE: 97 BPM | SYSTOLIC BLOOD PRESSURE: 100 MMHG

## 2025-03-18 DIAGNOSIS — F43.9 STRESS: ICD-10-CM

## 2025-03-18 DIAGNOSIS — R73.03 PREDIABETES: ICD-10-CM

## 2025-03-18 DIAGNOSIS — E66.9 OBESITY (BMI 30-39.9): ICD-10-CM

## 2025-03-18 DIAGNOSIS — R63.2 BINGE EATING: ICD-10-CM

## 2025-03-18 DIAGNOSIS — E78.5 HYPERLIPIDEMIA, UNSPECIFIED HYPERLIPIDEMIA TYPE: ICD-10-CM

## 2025-03-18 DIAGNOSIS — Z51.81 THERAPEUTIC DRUG MONITORING: Primary | ICD-10-CM

## 2025-03-18 DIAGNOSIS — E55.9 VITAMIN D DEFICIENCY: ICD-10-CM

## 2025-03-18 PROCEDURE — 3078F DIAST BP <80 MM HG: CPT | Performed by: NURSE PRACTITIONER

## 2025-03-18 PROCEDURE — 3008F BODY MASS INDEX DOCD: CPT | Performed by: NURSE PRACTITIONER

## 2025-03-18 PROCEDURE — 3074F SYST BP LT 130 MM HG: CPT | Performed by: NURSE PRACTITIONER

## 2025-03-18 PROCEDURE — 99214 OFFICE O/P EST MOD 30 MIN: CPT | Performed by: NURSE PRACTITIONER

## 2025-03-18 NOTE — PROGRESS NOTES
HISTORY OF PRESENT ILLNESS  Chief Complaint   Patient presents with    Weight Check     Down 2#     Lily Oconnell is a 39 year old female here for follow up with medical weight loss program for the treatment of overweight, obesity, or morbid obesity.     Down 2 lbs (f/u from 10/2024)  Compliant on zepbound 7.5mg weekly   Tolerating well, helping with decreasing appetite and no side effects     Was at #159 lb on home scale on Friday, had family members in town (ate out more frequently) feels like she is bloated a little bit more/water weight  Has been doing on gym frequently has been doing a mix of cardio and strength training   Has been trying to work on sleep for 2025 goal  Exercise/Activity: 7x/ week, via walking, not doing anything routine as far as exercise  Nutrition: eating regular meals, +protein, minimal veggies. not tracking reports   Meals out per week on average: 0  Stress is manageable   Sleep: 6-7 hours/night, waking up feeling rested    Denies chest pain, shortness of breath, dizziness, blurred vision, headache, paresthesia, nausea/vomiting.     Breakfast Lunch Dinner Snacks Fluids   Reviewed              Wt Readings from Last 6 Encounters:   03/18/25 166 lb (75.3 kg)   10/30/24 168 lb (76.2 kg)   04/24/24 195 lb (88.5 kg)   03/21/24 122 lb 3.2 oz (55.4 kg)   03/12/24 196 lb (88.9 kg)   10/30/23 194 lb (88 kg)          REVIEW OF SYSTEMS  GENERAL: feels well otherwise, denied any fevers chills or night sweats   LUNGS: denies shortness of breath  CARDIOVASCULAR: denies chest pain  GI: denies abdominal pain  MUSCULOSKELETAL: denies back pain, joint pains   PSYCH: denies change in behavior or mood, denies feeling sad or depressed    EXAM  /72   Pulse 97   Resp 16   Ht 5' 2.5\" (1.588 m)   Wt 166 lb (75.3 kg)   LMP 03/01/2025 (Approximate)   SpO2 97%   BMI 29.88 kg/m²       GENERAL: well developed, well nourished, in no apparent distress, A/O x3  SKIN: no rashes, no suspicious lesions  HEENT:  atraumatic, normocephalic, OP-clear, PERRLA  NECK: supple, no adenopathy  LUNGS: CTA in all fields, breathing non labored  CARDIO: RRR without murmur  GI: +BS, NT/ND, no masses or HSM  EXTREMITIES: no cyanosis, no clubbing, no edema    Lab Results   Component Value Date    GLU 85 08/28/2023    BUN 10 08/28/2023    BUNCREA 14.7 07/24/2021    CREATSERUM 0.95 08/28/2023    ANIONGAP 3 08/28/2023    GFRNAA 77 03/15/2022    GFRAA 88 03/15/2022    CA 8.6 08/28/2023    OSMOCALC 280 08/28/2023    ALKPHO 82 08/28/2023    AST 15 08/28/2023    ALT 25 08/28/2023    BILT 0.3 08/28/2023    TP 7.0 08/28/2023    ALB 3.4 08/28/2023    GLOBULIN 3.6 08/28/2023     08/28/2023    K 3.6 08/28/2023     08/28/2023    CO2 26.0 08/28/2023     Lab Results   Component Value Date    EAG 97 12/30/2024    A1C 5.0 12/30/2024     Lab Results   Component Value Date    CHOLEST 205 (H) 08/28/2023    TRIG 147 08/28/2023    HDL 54 08/28/2023     (H) 08/28/2023    VLDL 26 08/28/2023    NONHDLC 151 (H) 08/28/2023     Lab Results   Component Value Date    B12 417 08/28/2023     Lab Results   Component Value Date    VITD 30.6 08/28/2023       Medications Ordered Prior to Encounter[1]    ASSESSMENT/PLAN    ICD-10-CM    1. Therapeutic drug monitoring  Z51.81       2. Obesity (BMI 30-39.9)  E66.9       3. Vitamin D deficiency  E55.9       4. Prediabetes  R73.03       5. Binge eating  R63.2       6. Stress  F43.9       7. Hyperlipidemia, unspecified hyperlipidemia type  E78.5           PLAN   Initial Weight Data and Goal Weight Loss:  Initial consult: #215 lbs on 3/2022  Weight Calculations  Initial Weight: 215 lbs  Initial Weight Date: 03/01/22  Today's Weight: 166 lbs  5% Goal: 10.75  10% Goal: 21.5  Total Weight Loss: 49 lbs  Total weight loss: Down 2 lbs total, Net loss 49 lbs  Continue with medications: zepbound 7.5mg weekly (will stay at this dosage)   Had stop medication: phentermine 15mg  Had stop medications: metformin  500mg  --advised of side effects and adverse effects of this medication  Contradictions: has done metformin in the past, vyvanse    Reviewed labs  Will continue with vitamin d OTC  Binge eating, stable  HLD  stable, follows with PCP   Hx of prediabetes, reviewed last a1c 5.0% on 12.2024  Stress, alejandro  Wrote out macros and encouraged to track food   Nutrition: Low carb diet, recommended to eat breakfast daily/ regular protein intake  Follow up with dietitian and psychologist as recommended.  Discussed the role of sleep and stress in weight management.  Counseled on comprehensive weight loss plan including attention to nutrition, exercise and behavior/stress management for success. See patient instruction below for more details.  Discussed strategies to overcome barriers to successful weight loss and weight maintenance  FITTE: ACSM recommendations (150-300 minutes/ week in active weight loss)   Weight Loss Consent to treat reviewed and signed.    Total time spent on chart review, pre-charting, obtaining history, counseling, and educating, reviewing labs was 30 minutes.       NOTE TO PATIENT: The 21st Century Cures Act makes clinical notes like these available to patients in the interest of transparency. Clinical notes are medical documents used by physicians and care providers to communicate with each other. These documents include medical language and terminology, abbreviations, and treatment information that may sound technical and at times possibly unfamiliar. In addition, at times, the verbiage may appear blunt or direct. These documents are one tool providers use to communicate relevant information and clinical opinions of the care providers in a way that allows common understanding of the clinical context.     There are no Patient Instructions on file for this visit.    No follow-ups on file.    Patient verbalizes understanding.    Yoselyn East, MELVA             [1]   Current Outpatient Medications on  File Prior to Visit   Medication Sig Dispense Refill    Tirzepatide-Weight Management (ZEPBOUND) 7.5 MG/0.5ML Subcutaneous Solution Auto-injector Inject 7.5 mg into the skin once a week for 4 doses. 2 mL 1    CUSTOM MEDICATION Semaglutide 0.25mg/cyanobalamin   Concentration: 1/ 0.5 mg/mL  Instructions: Inject 25 units/0.25ml into skin q weekly  Dispense: 1 mL vial (Patient not taking: Reported on 3/18/2025) 1 each 2    Tirzepatide-Weight Management (ZEPBOUND) 5 MG/0.5ML Subcutaneous Solution Auto-injector Inject 5 mg into the skin once a week. (Patient not taking: Reported on 3/18/2025) 2 mL 3    metFORMIN  MG Oral Tablet 24 Hr Take 1 tablet (500 mg total) by mouth 2 (two) times daily with meals. (Patient not taking: Reported on 3/18/2025) 180 tablet 1    Phentermine HCl 37.5 MG Oral Tab Take 1 tablet (37.5 mg total) by mouth before breakfast. (Patient not taking: Reported on 3/18/2025) 90 tablet 0     No current facility-administered medications on file prior to visit.

## 2025-03-18 NOTE — PATIENT INSTRUCTIONS
Next steps:  1.  Fill your prescribed medication and take as discussed and prescribed: zepbound 7.5mg weekly   2.  Schedule a personal nutrition consultation with one of our registered dieticians     Please try to work on the following dietary changes:  Daily protein recommendation to start:  grams  Daily carbohydrate: <105g  Daily calories: 1,300  1.  Drink water with meals and throughout the day, cut down on soda and/or juice if consumed. Consider flavored water options like Bubbly, Spindrift, Hint and Shay.  2.  Eat breakfast daily and focus on having protein with each meal, examples include: greek yogurt, cottage cheese, hard boiled egg, whole grain toast with peanut butter.   3.  Reduce refined carbohydrates and sugars which includes items such as sweets, as well as rice, pasta, and bread and make sure to choose whole grain options when having them with just 1 serving per meal about the size of your inner palm.  4.  Consume non starchy veggies daily working towards making them a good 50% of your daily food intake. Add them to lunch and dinner consistently.  5.  Start a daily probiotic: VSL#3 is recommended, (order on line at www.vsl3.com). Take 1 capsule daily with water for 30 days, then reduce to 1 every other day (this will reduce the cost). Capsules can be left out for 2 weeks, but then must be refrigerated.      Please download karla My Fitness Pal, LoseIt! Or Net Diary to monitor daily dietary intake and you will be able to see if you are eating the right amount of calories or too much or too little which would hinder weight loss. Additionally this will help to see your daily carbohydrate and protein intake. When you set the karla up choose 1-2 lbs/week as a goal.  Keeping a paper food journal is an option as well to remain accountable for your choices- this is the start to mindful eating! A low calorie diet has been consistently shown to support weight loss.     Continue or start exercising to help  establish a routine. If not already exercising begin with 1 day and progress as able with long-term goal of 30 minutes 5 days a week at a minimum.     Meditation daily can help manage and control stress. Chronic stress can make weight loss difficult.  Exercising is one way to help with stress, but meditation using the CALM Araceli or another comparable alternative can be done in your home or place of work with little time commitment. This Araceli can also help work on behavior change and improve sleep. Check out the segment under Calm Masterclass and listen to The 4 Pillars of Health. A great way to begin learning about the foundation of lifestyle with practical tips to use in your every day.     Check out www.yourweightmatters.org blog for continued daily support and education along this weight loss journey!    Patient Resources:     Personal Training/Fitness Classes/Health Coaching     [facility] @ [email or web address]-driven/fitness-center Full fitness center with group fitness and personal training. Discount available as client of [facility].  Health Coaching and Personal Training with Mari Madrigal at our [facility]- individual weekly coaching with option to add personal training and small group fitness classes targeted at weight loss- [phone] and/or email @ [email or web address]  360FIT [place] http://www.[employer] Corporation. Group Fitness [phone] and/or email Ciarra at [email or web address]  [facility] Fitness Centers with multiple locations: Contractor Copilot (www.LeanKit), Eat The OOHLALA Mobile Fitness (www.Tutorspree.Cricket Media), Fit Body Bootcamp (www.StamplaybodybootPurchextp.Cricket Media), Con[employer] Pharm[employer] (www.Zerimar Ventures.Cricket Media), The Exercise  (www.exercisecoach.Cricket Media)     Online Fitness  Fitness  on Utube  Fit in 10 DVD series- www.pybvb93KMP.com  Sit and Be Fit - Chair exercise series Www.sitandbefit.org  Hip Hop  Fit with Gene Ravi at www.hiphopfit.net     Apps for on the Go Fitness  [place] 7 Minute Workout (orange box with white 7) - free on the go HIIT training araceli  Peloton Araceli @ www.onepeloton.com     Nutrition Trackers and Tools  LoseIT! And My Fitness Pal apps and on line for tracking nutrition  NOOM - virtual health coaching  FitFoundation (healthy meals on the go) in [place] @ www.xqlkcehxuzswe2d.GroupFlier  Bistro MD @ Cervalisbistromd.com and Oyiazs63 (keto and low carb plans recommended) @ www.yfrnyj37.com, Metabolic Meals @ www.MyMetabolicMeals.com - individual prepared meals to go  Gobble, Blue Apron, Home , Every Plate, Sunbasket- on line meal delivery programs for preparation at home  Meal Village in [place] for homemade meals to go @ www.mealvillage.GroupFlier  Diet Doctor @ www.dietdoctor.GroupFlier - low carb swaps  YuSensus Energy - meal prep and planning araceli (www.yummly.com)     Stress Management/Behavior/Mindful Eating  CALM meditation araceli (www.calm.com)  Headspace  Am I Hungry? Mindful eating virtual  araceli  Www.yourweightmatters.org - Obesity Action Coalition sponsored Blog posts daily  Motivation araceli (black box with white \")- daily supportive messages sent to your phone     Books/Video Education/Podcasts  Mindless Eating by Red Rome  Why We Get Sick by Luca Oliveira (a book about insulin resistance)  Atomic Habits by Samy Saucedo (a book about taking small steps to promote greater behavior change)   Can't Hurt Me by Fer Andrea (a book exploring the power of discipline in achieving your goals)  The End of Dieting: How to Live for Life by Dr. Alfa Pérez M.D. or listen to The OpenDoors.su Podcast Episode 63: Understanding \"Nutritarian\" Eating w/Dr. Alfa Pérez  Your Body in Balance: The New Science of Food, Hormones, and Health by Dr. Lc Khan  The Menopause Diet Plan by Iris Zheng and Dalia David  The Complete Guide to fasting by Dr. Hopkins  Sugar, Salt & Fat by Agatha Ahn, Ph.D, R.D.  Weight Loss  Surgery Will Not Treat Food Addiction by Adelia Padilla Ph.D  The Game Changers- PEVESAix Documentary on plant based nutrition  Fed Up - documentary about obesity (Free on Utube)  The Truth About Sugar - documentary on sugar (Free on Utube, https://youTokamak Solutionsu.be/0C7eprhGT4s)  The Dr. Eng T5 Wellness Plan by Dr. Robert Eng MD  Fitlosophy Fitspiration - journal to better health (found at [employer] in fitness aisle)  What Happened to You?- a look at the impact trauma has on behavior written by Edwin Gardenr and Dr. Macario Reilly  Whole Again by Marty Saini - discovering your true self after trauma  Kris Muñoz talk on Sugar Free Media, The Call to Courage  Podcasts: The Exam Room by the Physician's Committee, Nutrition Facts by Dr. Olivarez    We are here to support you with weight loss, but please remember that you still need your primary care provider for your routine health maintenance.

## 2025-04-14 ENCOUNTER — PATIENT MESSAGE (OUTPATIENT)
Dept: INTERNAL MEDICINE CLINIC | Facility: CLINIC | Age: 40
End: 2025-04-14

## 2025-04-14 DIAGNOSIS — E66.9 OBESITY (BMI 30-39.9): Primary | ICD-10-CM

## 2025-04-14 DIAGNOSIS — R63.2 BINGE EATING: ICD-10-CM

## 2025-04-14 DIAGNOSIS — R73.03 PREDIABETES: ICD-10-CM

## 2025-04-14 RX ORDER — TIRZEPATIDE 10 MG/.5ML
10 INJECTION, SOLUTION SUBCUTANEOUS WEEKLY
Qty: 2 ML | Refills: 2 | Status: SHIPPED | OUTPATIENT
Start: 2025-04-14 | End: 2025-05-06

## 2025-04-14 NOTE — TELEPHONE ENCOUNTER
[facility]- refill; requesting increase  -insurance will not pay for additional dose of Zepbound 7.5mg  Requesting Zepbound 10mg  LOV: 3/18/25  RTC: 5 months   Last Relevant Labs: n/a  Filled: 3/11/25 Zepbound 7.5mg #2ml with 1 refills    Future Appointments   Date Time Provider Department Center   6/16/2025  3:40 PM Yoselyn East APRN [facility]   9/17/2025  4:00 PM Yoselyn East APRN [facility]   1/12/2026 11:40 AM Yoselyn East APRN [facility]

## 2025-06-11 ENCOUNTER — OFFICE VISIT (OUTPATIENT)
Dept: PHYSICAL MEDICINE AND REHAB | Facility: CLINIC | Age: 40
End: 2025-06-11
Payer: COMMERCIAL

## 2025-06-11 ENCOUNTER — HOSPITAL ENCOUNTER (OUTPATIENT)
Dept: GENERAL RADIOLOGY | Age: 40
Discharge: HOME | End: 2025-06-11
Attending: PHYSICAL MEDICINE & REHABILITATION
Payer: COMMERCIAL

## 2025-06-11 VITALS
DIASTOLIC BLOOD PRESSURE: 70 MMHG | SYSTOLIC BLOOD PRESSURE: 104 MMHG | WEIGHT: 166 LBS | HEART RATE: 101 BPM | HEIGHT: 62.5 IN | OXYGEN SATURATION: 97 % | BODY MASS INDEX: 29.79 KG/M2

## 2025-06-11 DIAGNOSIS — M47.816 LUMBAR SPONDYLOSIS: Primary | ICD-10-CM

## 2025-06-11 DIAGNOSIS — M47.816 LUMBAR SPONDYLOSIS: ICD-10-CM

## 2025-06-11 PROCEDURE — 3074F SYST BP LT 130 MM HG: CPT | Performed by: PHYSICAL MEDICINE & REHABILITATION

## 2025-06-11 PROCEDURE — 3008F BODY MASS INDEX DOCD: CPT | Performed by: PHYSICAL MEDICINE & REHABILITATION

## 2025-06-11 PROCEDURE — 99204 OFFICE O/P NEW MOD 45 MIN: CPT | Performed by: PHYSICAL MEDICINE & REHABILITATION

## 2025-06-11 PROCEDURE — 3078F DIAST BP <80 MM HG: CPT | Performed by: PHYSICAL MEDICINE & REHABILITATION

## 2025-06-11 PROCEDURE — 72110 X-RAY EXAM L-2 SPINE 4/>VWS: CPT | Performed by: PHYSICAL MEDICINE & REHABILITATION

## 2025-06-11 NOTE — PROGRESS NOTES
NEW PATIENT VISIT    CHIEF COMPLAINT  Low back pain      HISTORY OF PRESENTING ILLNESS    Lily Oconnell is a 40 year old female who presents for evaluation of low back pain.  Patient is referred to my office with complaints of low back pain radiating down into the right lower extremity to the knee with onset of symptoms around 2 years ago.  No inciting or injury.  Describes it as a constant aching pain.  Pain is currently rated 6/10.  Endorses some numbness and tingling in the right leg.  Denies any weakness.  Currently using ibuprofen.  Denies any physical therapy or history of injections or surgeries.    History of Present Illness  Lily Oconnell is a 40 year old female who presents with chronic lower back pain radiating to the hip and leg.    She experiences persistent, tight pain at the base of her spine, radiating through the hip to the knee and foot, with occasional numbness in the foot. The pain worsens after physical activities like extensive walking and is relieved by bending forward but exacerbated by leaning back. She has tried weight loss, a new mattress, and ibuprofen, which initially helped but are now less effective. Core strengthening exercises often worsen her pain. 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 She has not sought medical attention or attended physical therapy previously and denies any specific incident that triggered the pain.  PAST MEDICAL HISTORY  Past Medical History[1]    PAST SURGICAL HISTORY  Past Surgical History[2]    MEDICATIONS  Medications Ordered Prior to Encounter[3]    ALLERGIES  Allergies[4]    SOCIAL HISTORY   reports that she has never smoked. She has never been exposed to tobacco smoke. She has never used smokeless tobacco. She reports that she does not currently use alcohol. She reports that she does not use drugs.    FAMILY HISTORY  Family History[5]    REVIEW OF SYSTEMS  Complete review of systems was performed and  was negative except for those items stated in the History of Presenting Illness and Past Medical/Surgical History.    PHYSICAL EXAMINATION  GENERAL:  In no acute distress. Well-developed and well nourished.   SKIN: No rashes or open wounds involving posterior torso, posterior pelvis, lower extremities.  NEUROLOGIC:   Strength: 5/5 bilaterally with hip flexion, knee extension, knee flexion, ankle dorsiflexion, ankle eversion, ankle inversion, ankle plantarflexion, and great toe extension.   Sensation: intact light touch sensation throughout both lower extremities.   Reflexes: intact and symmetric in bilateral lower extremities. Babinski downgoing bilaterally. No clonus.   Gait: able to heel walk, toe walk, and perform tandem gait.   MUSCULOSKELETAL:  Physical Exam  MUSCULOSKELETAL: Pain on lumbar spine extension with relief on flexion  NEUROLOGICAL: Sensation symmetric and intact       REVIEW OF PRIOR X-RAYS/STUDIES  Ordered and independently reviewed the x-ray of the lumbar spine dated 6/11/2025, which reveals no acute abnormality in the lumbar spine, mild dextroscoliosis with mild facet arthropathy in the upper lumbar spine as expected.    IMPRESSION/DIAGNOSIS  Encounter Diagnosis   Name Primary?    Lumbar spondylosis Yes       TREATMENT/PLAN  Assessment & Plan  Low back pain, lumbar spondylosis, intermittent radicular pain in the lower extremities.  Chronic low back pain with radiating symptoms. Possible degenerative changes, sacroiliac joint involvement, or sciatica. Diminished ibuprofen efficacy. Discussed nerve involvement and need for further evaluation.  - Order X-ray to assess joint motion and bony structures.  - Refer to physical therapy for evaluation and treatment.  - Consider MRI if physical therapy does not provide relief to evaluate discs and nerves.  - Recommend Tylenol over ibuprofen for pain management.      Education was provided regarding the above impression/diagnosis and treatment options/plan  were discussed.  All questions were answered during today's visit.  Patient will contact clinic if any other questions or concerns.            Danilo Shah DO  Interventional Spine and Sports Medicine Specialist   Physical Medicine and Rehabilitation  [place] [id number] [place] [id number]    [facility]  [address] [place] [address]                 [1]   Past Medical History:   Anemia    Change in hair    I feel my hair is falling out. It comes out in handfuls.    Chest pain    Decorative tattoo    Easy bruising    Esophageal reflux    Heart palpitations    Heartburn    Also during pregnancy but never before or outside of pregnancy before     High cholesterol    Hoarseness, chronic    Hyperlipidemia    Indigestion    Migraines    needed to start wearing my glasses    Nausea    Obesity    Problems with swallowing    I feel rhwre is something “stuck in my throat” often    Sleep disturbance    Stress    Uncomfortable fullness after meals    Wears glasses   [2]   Past Surgical History:  Procedure Laterality Date           and     Other surgical history  2009    [place] teeth   [3]   Current Outpatient Medications on File Prior to Visit   Medication Sig Dispense Refill    CUSTOM MEDICATION Semaglutide 0.25mg/cyanobalamin   Concentration: 1/ 0.5 mg/mL  Instructions: Inject 25 units/0.25ml into skin q weekly  Dispense: 1 mL vial (Patient not taking: Reported on 3/18/2025) 1 each 2    Tirzepatide-Weight Management (ZEPBOUND) 5 MG/0.5ML Subcutaneous Solution Auto-injector Inject 5 mg into the skin once a week. (Patient not taking: Reported on 3/18/2025) 2 mL 3    metFORMIN  MG Oral Tablet 24 Hr Take 1 tablet (500 mg total) by mouth 2 (two) times daily with meals. (Patient not taking: Reported on 3/18/2025) 180 tablet 1    Phentermine HCl 37.5 MG Oral Tab Take 1 tablet (37.5 mg total) by mouth before breakfast. (Patient not  taking: Reported on 3/18/2025) 90 tablet 0     No current facility-administered medications on file prior to visit.   [4]   Allergies  Allergen Reactions    Penicillin G HIVES    Morphine ITCHING and RASH   [5]   Family History  Problem Relation Age of Onset    Diabetes Father     Stroke Father     Thyroid disease Father     Hypertension Father     Lipids Father     Obesity Father     Heart Attack Father     Dementia Father     Carotid stenosis Father     Diabetes Mother     Heart Attack Mother     Thyroid disease Mother     Hypertension Mother     Lipids Mother     Obesity Mother     Depression Mother     Cancer Mother     No Known Problems Maternal Grandmother     Diabetes Maternal Grandfather     Other (Other) Maternal Grandfather         Parkinson's Disease    Other (COPD) Paternal Grandmother     Other (Alzheimer's Disease) Paternal Grandfather     Other (sepsis) Sister     No Known Problems Daughter

## 2025-06-11 NOTE — PROGRESS NOTES
The following individual(s) verbally consented to be recorded using ambient AI listening technology and understand that they can each withdraw their consent to this listening technology at any point by asking the clinician to turn off or pause the recording:    Patient name: Caroline Anish  Additional names:

## 2025-06-16 ENCOUNTER — OFFICE VISIT (OUTPATIENT)
Dept: INTERNAL MEDICINE CLINIC | Facility: CLINIC | Age: 40
End: 2025-06-16
Payer: COMMERCIAL

## 2025-06-16 ENCOUNTER — TELEPHONE (OUTPATIENT)
Dept: PHYSICAL THERAPY | Age: 40
End: 2025-06-16

## 2025-06-16 VITALS
WEIGHT: 167 LBS | OXYGEN SATURATION: 98 % | SYSTOLIC BLOOD PRESSURE: 106 MMHG | HEIGHT: 62.5 IN | BODY MASS INDEX: 29.96 KG/M2 | HEART RATE: 94 BPM | DIASTOLIC BLOOD PRESSURE: 74 MMHG | RESPIRATION RATE: 18 BRPM

## 2025-06-16 DIAGNOSIS — R63.2 BINGE EATING: ICD-10-CM

## 2025-06-16 DIAGNOSIS — R73.03 PREDIABETES: ICD-10-CM

## 2025-06-16 DIAGNOSIS — E78.5 HYPERLIPIDEMIA, UNSPECIFIED HYPERLIPIDEMIA TYPE: ICD-10-CM

## 2025-06-16 DIAGNOSIS — E55.9 VITAMIN D DEFICIENCY: ICD-10-CM

## 2025-06-16 DIAGNOSIS — E66.9 OBESITY (BMI 30-39.9): ICD-10-CM

## 2025-06-16 DIAGNOSIS — F43.9 STRESS: ICD-10-CM

## 2025-06-16 DIAGNOSIS — Z51.81 THERAPEUTIC DRUG MONITORING: Primary | ICD-10-CM

## 2025-06-16 PROCEDURE — 3078F DIAST BP <80 MM HG: CPT | Performed by: NURSE PRACTITIONER

## 2025-06-16 PROCEDURE — 3008F BODY MASS INDEX DOCD: CPT | Performed by: NURSE PRACTITIONER

## 2025-06-16 PROCEDURE — 3074F SYST BP LT 130 MM HG: CPT | Performed by: NURSE PRACTITIONER

## 2025-06-16 PROCEDURE — 99213 OFFICE O/P EST LOW 20 MIN: CPT | Performed by: NURSE PRACTITIONER

## 2025-06-16 RX ORDER — TIRZEPATIDE 10 MG/.5ML
INJECTION, SOLUTION SUBCUTANEOUS
COMMUNITY
Start: 2025-06-10

## 2025-06-16 NOTE — PATIENT INSTRUCTIONS
Next steps:  1.  Fill your prescribed medication and take as discussed and prescribed: zepbound 10mg weekly   2.  Continue with meeting with dietitian as directed    3.  Use contraception at all times while on anti-obesity medications.     Please try to work on the following dietary changes:  Daily protein recommendation to start:  grams  Daily carbohydrate:  <100g  Daily calories: 1,300-1,400  1.  Drink water with meals and throughout the day, cut down on soda and/or juice if consumed. Consider flavored water options like Bubbly, Spindrift, Hint and Shay. Reduce alcohol servings to 4 per week maximum.  2.  Have protein with each meal, examples include: greek yogurt, cottage cheese, hard boiled egg, tofu, chicken, fish, or tuna. Recommended daily protein intake is 100 grams/day.   3.  Work towards reducing/eliminating refined carbohydrates and sugars which includes items such as sweets, as well as rice, pasta, and bread and make sure to choose whole grain options when having them with just 1 serving per meal about the size of your inner palm.  4.  Consume non starchy veggies daily working towards making them a good 50% of your daily food intake. Add them to lunch and dinner consistently.  5.  Start a daily probiotic: VSL#3 is recommended, (order on line at www.vsl3.com). Take 1 capsule daily with water for 30 days, then reduce to 1 every other day (this will reduce the cost). Capsules can be left out of refrigerator for 2 weeks. I recommend using a pill box weekly and keeping the bottle in the fridge.     Please download karla My Fitness Pal, LoseIt! Or My Net Diary to monitor daily dietary intake and you will be able to see if you are eating the right amount of calories or too much or too little which would hinder weight loss. Additionally this will help to see your daily carbohydrate and protein intake. When you set the karla up choose 1.5 lbs/week as a goal.  Keeping a paper food journal is an option as well  to remain accountable for your choices- this is the start to mindful eating! A low calorie diet has been consistently shown to support weight loss.     Continue or start exercising to help establish a routine. If not already exercising begin with 1 day/week and progress as able with the goal of working towards 30 minutes 5 days a week at a minimum. A variety or cardio, strength and stretching is important. Review resources below to help support you in building this healthy routine.     Meditation daily can help manage and control stress. Chronic stress can make weight loss difficult.  Exercising is one way to help with stress, but meditation using the CALM Araceli or another comparable alternative can be done in your home or place of work with little time commitment. This Araceli can also help work on behavior change and improve sleep. Check out the segment under Calm Masterclass and listen to The 4 Pillars of Health. A great way to begin learning about the foundation of lifestyle with practical tips to use in your every day. In addition, we offer counseling services and support for individual connection and care. A referral is necessary so please let me know if this is a service you are interested.     Check out www.yourweightmatters.org blog for continued support and education along your weight loss journey to optimal health!  Patient Resources:     Personal Training/Fitness Classes/Health Coaching     [facility] in [place]: Full fitness center with group fitness and personal training located in [place].  Health Coaching with Mari Madrigal, Pineda Ventura, and Sathish Carrington at our [facility]- individual coaching to work on your health goals. Call [phone] and/or email @ [email or web address]. Free 60 minute consult when client of [employer] [facility].  CHRISTINA Albarran @ http://www.lexi.Askablogr. A variety of group fitness options plus various yoga classes [phone]  and/or email Ciarra at [email or web address]  Franchised Fitness Centers with multiple locations: Sol Voltaics Fitness (www.Edvisor.io.eRepublik), F45 Training (www.p30gefehrmx.eRepublik), GateRocket Body Bootcamp (www.7fgamebodyboCloud Takeoffp.eRepublik), Convergent.io Technologies (www.PublicEarth.eRepublik), The Exercise  (www.exercisecoach.com), Club Pilates (www.clubpilaThe Online Backup Company.eRepublik)     Online Fitness  Fitness  on Utube  Fit in 10 DVD series                              www.sxfix24UWHFlirq  Chair exercises via Sit and Be Fit (www.sitandbefit.org) and REEL Qualified (www.ePantry.com) or Asif Gardner or Sergio Tejada videos on YouTube.  Hip Hop Fit with Pedro Ravi at www.hiphopfit.BigEvidence     Apps for on the Go Fitness  [place] 7 Minute Workout (orange box with white 7) - free on the go HIIT training araceli  Peloton Araceli @ www.onepeloton.com     Nutrition Trackers, Meal Preparation, and Other Meal Programs  LoseIT! And MineralRightsWorldwide.com Fitness Pal apps and on line for tracking nutrition  NOOM - virtual health coaching  FitFoundation (healthy meals on the go) in [place] @ wwwTRDatadyuzgbbldwijy9eFlirq  Phil MONROY @ Gaosi Education GroupbistrSviral and Wrrwre34 (calorie smart and low carb plans recommended) @ www.qbokfk08.com, Metabolic Meals @ www.MineralRightsWorldwide.comMetabolicMeals.com - individual prepared meals to go  Gobble, Blue Apron, Home , Every Plate, Sunbasket- on line meal delivery programs for preparation at home  Meal Village in [place] for homemade meals to go @ www.mealvillage.com  Diet Doctor @ www.dietdoctor.com - low carb swaps  ReciMe and Mealime araceli (grocery and meal planning)     Stress, Anxiety, Depression, Trauma  CALM meditation araceli (www.calm.com)  Headspace  Don't let anxiety run your life. Using the science of emotion regulation and mindfulness to overcome fear and worry by Fer Song PsyD and Wilmer Hankins MA.  The TwitJump Podcast (September 27, 2023): 6 Magic Words That Stop Anxiety  What Happened to You?- a look at the impact trauma has on  behavior written by Edwin Gardner and Dr. Macario Reilly  Whole Again by Marty Saini - discovering your true self after trauma     Mindful Eating/The Hungry Brain  Am I Hungry? Mindful eating virtual  karla (www.amihungry.com)  The Hungry Brain by Carmel Whitt, PhD  Mindless Eating by Red Rome  Weight Loss Surgery Will Not Treat Food Addiction by Adelia Padilla Ph.D     Metabolic Dysfunction, Hormones and Cravings  Why We Get Sick? By Luca Oliveira (insulin resistance)  Your Body in Balance: The New Science of Food, Hormones, and Health by Dr. Lc Khan  The Complete Guide to fasting by Dr. Hopkins  Fast Like a Girl by Dr. Brianna Lemons  The M Factor (documentary on PBS about Menopause)  Sugar, Salt & Fat by Agatha Ahn, Ph.D, R.D.  The Truth About Sugar - documentary on sugar (Free on Giiv, https://O'ol Blueu.be/2D9ugtoWE2b)  Presentation on SUGAR called Sugar: The Bitter Truth by Dr. Wilbert Irby (Giiv) https://JumpStart Wireless Corporation.be/dBnniua6-oM?si=[email or web address]  Reverse Visceral Fat: #1 Way to Increase Your Lifespan & End Inflammation with Dr. Judson Alanis on Utube @ https://JumpStart Wireless Corporation.be/nupPRnvUpJY?si=tb6fuzPaMGA6AzvY     Nutrition Support  You Are What You Eat - Netfix series on twin study looking at impact of nutrition changes on health  The End of Dieting: How to Live for Life by Dr. Alfa Pérez M.D. or listen to The EpicForce Podcast Episode 63: Understanding \"Nutritarian\" Eating w/Dr. Alfa Pérez  The Game Changers- Netflix Documentary on plant based nutrition  The Dr. Eng T5 Wellness Plan by Dr. Robert Eng MD  The Complete Guide to fasting by Dr. Hopkins  @[email or web address] (Instagram Dietician with support surrounding nutrition and meal prep/planning)     Education, Motivation and Support Resources  Live to 100: Secrets of the Blue Zones - Netflix series on the secrets to communities living over 100 years old  Atomic Habits by Samy Saucedo (a book about taking small steps to promote greater behavior  change)   Motivation karla (black box with white \")- daily supportive messages sent to your phone  Can't Hurt Me by Fer Andrea (a book exploring the power of discipline in achieving your goals)  Fed Up - documentary about obesity (Free on Utube)  Www.yourweightmatters.org - Obesity Action Coalition sponsored Blog posts  Obesity Action Coalition Resources on topics specific to weight management (www.obesityaction.org)  Fitlosophy Fitspiration - journal to better health (journal book found at [employer] in fitness aisle)  Kris Muñoz talk titled: The Call to Courage (Netflix)  The Exam Room by the Physician's Committee (Podcast)  Nutrition Facts by Dr. Olivarez (Podcast)

## 2025-06-16 NOTE — PROGRESS NOTES
HISTORY OF PRESENT ILLNESS  Chief Complaint   Patient presents with    Weight Check     Up 1 lb 3/18/25     Lily Oconnell is a 40 year old female here for follow up with medical weight loss program for the treatment of overweight, obesity, or morbid obesity.     Up #1 lbs lbs follow-up from 3/2025  Compliant on Zepbound 10 mg weekly  Tolerating well, helping with decreasing appetite and no side effects     Has been taking  Was down to #164 lbs (arthritis in back) will start PT soon  Success: I say no a lot more  Challenging: I see no difference and can be discouraged   Exercise/Activity: 5x/ week, via walking (3 miles per week)  Nutrition: eating regular meals, +protein, minimal veggies. not tracking reports  Meals out per week on average: 0  Stress is manageable  Sleep: 6 hours/night, waking up feeling rested    Denies chest pain, shortness of breath, dizziness, blurred vision, headache, paresthesia, nausea/vomiting.     Breakfast Lunch Dinner Snacks Fluids   Reviewed              Wt Readings from Last 6 Encounters:   06/16/25 167 lb (75.8 kg)   06/11/25 166 lb (75.3 kg)   03/18/25 166 lb (75.3 kg)   10/30/24 168 lb (76.2 kg)   04/24/24 195 lb (88.5 kg)   03/21/24 122 lb 3.2 oz (55.4 kg)          REVIEW OF SYSTEMS  GENERAL: feels well otherwise, denied any fevers chills or night sweats   LUNGS: denies shortness of breath  CARDIOVASCULAR: denies chest pain  GI: denies abdominal pain  MUSCULOSKELETAL: denies back pain, joint pains   PSYCH: denies change in behavior or mood, denies feeling sad or depressed    EXAM  /74   Pulse 94   Resp 18   Ht 5' 2.5\" (1.588 m)   Wt 167 lb (75.8 kg)   LMP 06/15/2025 (Approximate)   SpO2 98%   BMI 30.06 kg/m²       GENERAL: well developed, well nourished, in no apparent distress, A/O x3  SKIN: no rashes, no suspicious lesions  HEENT: atraumatic, normocephalic, OP-clear, PERRLA  NECK: supple, no adenopathy  LUNGS: CTA in all fields, breathing non labored  CARDIO: RRR  without murmur  GI: +BS, NT/ND, no masses or HSM  EXTREMITIES: no cyanosis, no clubbing, no edema    Lab Results   Component Value Date    GLU 85 08/28/2023    BUN 10 08/28/2023    BUNCREA 14.7 07/24/2021    CREATSERUM 0.95 08/28/2023    ANIONGAP 3 08/28/2023    GFRNAA 77 03/15/2022    GFRAA 88 03/15/2022    CA 8.6 08/28/2023    OSMOCALC 280 08/28/2023    ALKPHO 82 08/28/2023    AST 15 08/28/2023    ALT 25 08/28/2023    BILT 0.3 08/28/2023    TP 7.0 08/28/2023    ALB 3.4 08/28/2023    GLOBULIN 3.6 08/28/2023     08/28/2023    K 3.6 08/28/2023     08/28/2023    CO2 26.0 08/28/2023     Lab Results   Component Value Date    EAG 97 12/30/2024    A1C 5.0 12/30/2024     Lab Results   Component Value Date    CHOLEST 205 (H) 08/28/2023    TRIG 147 08/28/2023    HDL 54 08/28/2023     (H) 08/28/2023    VLDL 26 08/28/2023    NONHDLC 151 (H) 08/28/2023     Lab Results   Component Value Date    B12 417 08/28/2023     Lab Results   Component Value Date    VITD 30.6 08/28/2023       Medications Ordered Prior to Encounter[1]    ASSESSMENT/PLAN    ICD-10-CM    1. Therapeutic drug monitoring  Z51.81       2. Obesity (BMI 30-39.9)  E66.9       3. Prediabetes  R73.03       4. Binge eating  R63.2       5. Vitamin D deficiency  E55.9       6. Stress  F43.9       7. Hyperlipidemia, unspecified hyperlipidemia type  E78.5           PLAN   Initial Weight Data and Goal Weight Loss:  Initial consult: # 215 lbs on 3/202 2  Weight Calculations  Initial Weight: 215 lbs  Initial Weight Date: 03/01/22  Today's Weight: 167 lbs  5% Goal: 10.75  10% Goal: 21.5  Total Weight Loss: 48 lbs  Total weight loss: up #1 lbs total, Net loss 48 lbs  Continue with medications: Zepbound 10 mg weekly- wants to stay at this dosage  --advised of side effects and adverse effects of this medication  --Instructed to use contraception at all times while on AOMs  Contradictions: has done metformin in the past, vyvanse, phentermine  Reviewed labs  Will  continue with vitamin d OTC  Binge eating, stable  HLD  stable, follows with PCP   Hx of prediabetes, reviewed last a1c 5.0% on 12.2024  Stress, stable  Wrote out macros and encouraged tracking food  Nutrition: Low carb diet, recommended to eat breakfast daily/ regular protein intake  Follow up with dietitian and psychologist as recommended.  Discussed the role of sleep and stress in weight management.  Counseled on comprehensive weight loss plan including attention to nutrition, exercise and behavior/stress management for success. See patient instruction below for more details.  Discussed strategies to overcome barriers to successful weight loss and weight maintenance  FITTE: ACSM recommendations (150-300 minutes/ week in active weight loss)   Weight Loss Consent to treat reviewed and signed.    Total time spent on chart review, pre-charting, obtaining history, counseling, and educating, reviewing labs was 27 minutes.       NOTE TO PATIENT: The 21st Century Cures Act makes clinical notes like these available to patients in the interest of transparency. Clinical notes are medical documents used by physicians and care providers to communicate with each other. These documents include medical language and terminology, abbreviations, and treatment information that may sound technical and at times possibly unfamiliar. In addition, at times, the verbiage may appear blunt or direct. These documents are one tool providers use to communicate relevant information and clinical opinions of the care providers in a way that allows common understanding of the clinical context.     There are no Patient Instructions on file for this visit.    No follow-ups on file.    Patient verbalizes understanding.    MELVA Calvert             [1]   Current Outpatient Medications on File Prior to Visit   Medication Sig Dispense Refill    ZEPBOUND 10 MG/0.5ML Subcutaneous Solution Auto-injector       CUSTOM MEDICATION Semaglutide  0.25mg/cyanobalamin   Concentration: 1/ 0.5 mg/mL  Instructions: Inject 25 units/0.25ml into skin q weekly  Dispense: 1 mL vial (Patient not taking: Reported on 3/18/2025) 1 each 2    Tirzepatide-Weight Management (ZEPBOUND) 5 MG/0.5ML Subcutaneous Solution Auto-injector Inject 5 mg into the skin once a week. (Patient not taking: Reported on 6/16/2025) 2 mL 3    metFORMIN  MG Oral Tablet 24 Hr Take 1 tablet (500 mg total) by mouth 2 (two) times daily with meals. (Patient not taking: Reported on 3/18/2025) 180 tablet 1    Phentermine HCl 37.5 MG Oral Tab Take 1 tablet (37.5 mg total) by mouth before breakfast. (Patient not taking: Reported on 3/18/2025) 90 tablet 0     No current facility-administered medications on file prior to visit.

## 2025-07-22 DIAGNOSIS — E66.9 OBESITY (BMI 30-39.9): Primary | ICD-10-CM

## 2025-07-23 NOTE — TELEPHONE ENCOUNTER
Requesting   Requested Prescriptions     Pending Prescriptions Disp Refills    ZEPBOUND 10 MG/0.5ML Subcutaneous Solution Auto-injector [Pharmacy Med Name: Zepbound Subcutaneous Solution Auto-injector 10 MG/0.5ML] 2 mL 0     Sig: Inject 10 mg into the skin once a week for 4 doses.       LOV: 6/16/2025  RTC: 9/17/2025  Last Relevant Labs: na  Filled: 10/30/2024 #2 ml with 3 refills    Future Appointments   Date Time Provider Department Center   9/17/2025  4:00 PM Yoselyn East APRN [facility]   1/12/2026 11:40 AM Yoselyn East APRN [facility]   4/13/2026  8:40 AM Yoselyn East APRN [facility]

## 2025-07-24 RX ORDER — TIRZEPATIDE 10 MG/.5ML
INJECTION, SOLUTION SUBCUTANEOUS
Qty: 2 ML | Refills: 3 | Status: SHIPPED | OUTPATIENT
Start: 2025-07-24

## 2025-07-24 RX ORDER — TIRZEPATIDE 10 MG/.5ML
10 INJECTION, SOLUTION SUBCUTANEOUS WEEKLY
Qty: 2 ML | Refills: 3 | Status: SHIPPED | OUTPATIENT
Start: 2025-07-24

## (undated) NOTE — Clinical Note
Ms. Macario Roles is down #21 lbs total!  Sincerely, MELVA Hernandez APRN, FNP-BC Obesity Medicine [address] [facility]  [facility] [address], [address] [facility], [place], [address]   [phone] [facility]. org

## (undated) NOTE — LETTER
03/14/22    Dmitriy Stephens 89    Dear Betzaida oPlanco,    This is a friendly reminder to let you know it's time to schedule your follow up and  you have outstanding lab work. To schedule an office visit, please call our office at [phone]. To complete your labs,  please call Central Scheduling at [phone]. You may also schedule on [employer] or go online at [facility].     Your labs do not require fasting. You may drink water up until the time of your lab appointment. Morning medication is ok to take the morning of your lab appointment. If you take a Multi-Vitamin with Biotin or any Biotin products, please hold for 3 days prior to getting your labs done. Zoie, Direct, Reflex To 9 Enas       C-Reactive Protein       Sed Rate, Westergren (Automated)       Cyclic Citrullinate Pep. IGG        To provide you with the best possible care, please complete these orders at your earliest convenience. If you have recently completed these orders please disregard this letter. If you have any questions please call the office at Dept: [phone].      Thank you,     MELVA Pratt